# Patient Record
Sex: FEMALE | Race: WHITE | NOT HISPANIC OR LATINO | Employment: PART TIME | URBAN - METROPOLITAN AREA
[De-identification: names, ages, dates, MRNs, and addresses within clinical notes are randomized per-mention and may not be internally consistent; named-entity substitution may affect disease eponyms.]

---

## 2017-04-26 ENCOUNTER — ALLSCRIPTS OFFICE VISIT (OUTPATIENT)
Dept: OTHER | Facility: OTHER | Age: 27
End: 2017-04-26

## 2017-09-20 ENCOUNTER — OFFICE VISIT (OUTPATIENT)
Dept: URGENT CARE | Age: 27
End: 2017-09-20

## 2017-11-07 ENCOUNTER — ALLSCRIPTS OFFICE VISIT (OUTPATIENT)
Dept: OTHER | Facility: OTHER | Age: 27
End: 2017-11-07

## 2017-11-08 NOTE — PROGRESS NOTES
Assessment  1  Acute otitis media (382 9) (H66 90)    Plan  Acute otitis media    · Azithromycin 250 MG Oral Tablet; TAKE 2 TABLETS ON DAY one THEN TAKE one  TABLET A DAY FOR 4 DAYS    Discussion/Summary    Rest  drink plenty of fluids  return for new/worsening s/sx  The patient was counseled regarding instructions for management,-- risk factor reductions,-- prognosis,-- patient and family education,-- impressions  Chief Complaint  pt here c/o cough, stuffy nose, and right ear feeling clogged, pt c/o feeling like something stuck in throat, pt states she has had symptoms for about 1 week      History of Present Illness  HPI: Pt here for cold s/sx for approx 1 week   Cold Symptoms:   Beverly Call presents with complaints of gradual onset of constant episodes of mild cold symptoms Episodes started 1 week ago (Pt works as a  @ [employer])   Associated symptoms include sore throat,-- facial pressure,-- facial pain,-- headache-- and-- vomiting, but-- no wheezing,-- no shortness of breath,-- no nausea-- and-- no fever  The patient presents with complaints of nasal congestion (yellow)   The patient presents with complaints of productive cough (yellow mucous  also states she coughs severely and causes her to vomit  sensitive gag reflex)   The patient presents with complaints of ear pain (R ear)      Review of Systems    Constitutional: feeling poorly,-- chills-- and-- feeling tired, but-- no fever  ENT: as noted in HPI  Cardiovascular: no chest pain  Respiratory: cough, but-- no shortness of breath-- and-- no wheezing  Gastrointestinal: vomiting, but-- no abdominal pain-- and-- no nausea  Musculoskeletal: no arthralgias-- and-- no myalgias  Integumentary: no rashes  Active Problems  1  Depression with anxiety (300 4) (F41 8)   2  's permit PE (physical examination) (V70 3) (Z02 4)   3   Hypothyroidism (244 9) (E03 9)    Past Medical History  Active Problems And Past Medical History Reviewed: The active problems and past medical history were reviewed and updated today  Surgical History  Surgical History Reviewed: The surgical history was reviewed and updated today  Social History   · Denied: History of Alcohol use   · Denied: History of IV drugs   · Never smoker   · Denied: History of Uses marijuana  The social history was reviewed and updated today  The social history was reviewed and is unchanged  Family History  Family History Reviewed: The family history was reviewed and updated today  Current Meds   1  Daniel 1/20 TABS; Therapy: (Recorded:20Sep2017) to Recorded    The medication list was reviewed and updated today  Allergies  1  Latex Exam Gloves MISC   2  Penicillins  3  Nickel    Vitals   Recorded: [id number] 11:03AM   Temperature 98 5 F, Oral   Heart Rate 98   Systolic 560, LUE, Sitting   Diastolic 82, LUE, Sitting   Height 5 ft 2 in   Weight 113 lb    BMI Calculated 20 67   BSA Calculated 1 5   O2 Saturation 96, RA     Physical Exam    Constitutional   General appearance: No acute distress, well appearing and well nourished  Eyes   Conjunctiva and lids: No swelling, erythema or discharge  Pupils and irises: Equal, round and reactive to light  Ears, Nose, Mouth, and Throat   External inspection of ears and nose: Normal     Otoscopic examination: Abnormal  -- R ear TM + erythema and bulging  L TM normal    Nasal mucosa, septum, and turbinates: Normal without edema or erythema  Oropharynx: Normal with no erythema, edema, exudate or lesions  -- MMM  Pulmonary   Respiratory effort: No increased work of breathing or signs of respiratory distress  Auscultation of lungs: Clear to auscultation  -- no rhonchi or wheezing  Cardiovascular   Auscultation of heart: Normal rate and rhythm, normal S1 and S2, without murmurs  Lymphatic   Palpation of lymph nodes in neck: No lymphadenopathy      Musculoskeletal   Gait and station: Normal     Skin Skin and subcutaneous tissue: Normal without rashes or lesions  Psychiatric   Orientation to person, place, and time: Normal     Mood and affect: Normal          Future Appointments    Date/Time Provider Specialty Site   11/10/2017 01:15 PM Nidia Mauricio [facility]     Signatures   Electronically signed by :  [employer], [facility]; Nov 7 2017 11:25AM EST                       (Author)    Electronically signed by : Franchesca Valentin DO; Nov 7 2017  3:28PM EST

## 2017-11-10 ENCOUNTER — ALLSCRIPTS OFFICE VISIT (OUTPATIENT)
Dept: OTHER | Facility: OTHER | Age: 27
End: 2017-11-10

## 2018-01-10 NOTE — MISCELLANEOUS
Provider Comments  Provider Comments:   pt no call no show for appt   attempted to call pt unable to leave message      Signatures   Electronically signed by : Raquel Campbell [facility]; Nov 10 2017  3:19PM EST                       (Author)

## 2018-01-13 VITALS
SYSTOLIC BLOOD PRESSURE: 104 MMHG | BODY MASS INDEX: 20.8 KG/M2 | HEIGHT: 62 IN | DIASTOLIC BLOOD PRESSURE: 82 MMHG | WEIGHT: 113 LBS | TEMPERATURE: 98.5 F | OXYGEN SATURATION: 96 % | HEART RATE: 98 BPM

## 2018-01-16 NOTE — MISCELLANEOUS
Message  Return to work or school:  11/7   She is able to return to work on  11/8      Please excuse pt from work on 11/6  Signatures   Electronically signed by :  TRUE Byrd; Nov 7 2017 11:12AM EST                       (Author)

## 2018-01-17 NOTE — MISCELLANEOUS
Provider Comments  Provider Comments:   PT NO CALL NO SHOW FOR APPT 4/26/2017      Signatures   Electronically signed by : [employer], [address];  Apr 26 2017  4:18PM EST                       (Author)

## 2018-01-18 NOTE — PROGRESS NOTES
Assessment    1  's permit PE (physical examination) (V70 3) (Z02 4)    Plan  Unlinked    · Levothyroxine Sodium 75 MCG Oral Tablet    Discussion/Summary  Discussion Summary:   Form complete  Practice safe driving habits  Medication Side Effects Reviewed: Possible side effects of new medications were reviewed with the patient/guardian today  Understands and agrees with treatment plan: The treatment plan was reviewed with the patient/guardian  The patient/guardian understands and agrees with the treatment plan   Counseling Documentation With Imm: The patient was counseled regarding instructions for management, patient and family education, importance of compliance with treatment  Follow Up Instructions: Follow Up with your Primary Care Provider in days  If your symptoms worsen, [facility] the [facility]  Chief Complaint  Chief Complaint Free Text Note Form: self pay drivers physical      History of Present Illness  HPI: This is a 27year old female here today for learners permit physical  She has history of anxiety/ depression  she states it is controlled at this time  She had dizziness about 3 years ago which has now spontonenously resolved only last several months  she denies any other conditions  Hospital Based Practices Required Assessment:   Pain Assessment   the patient states they do not have pain  Abuse And Domestic Violence Screen   The patient states no one is hurting them  Depression And Suicide Screen  No, the patient has not had thoughts of hurting themself  No, the patient has not felt depressed in the past 7 days  Prefered Language is  english  Review of Systems  Focused-Female:   Constitutional: No fever, no chills, feels well, no tiredness, no recent weight gain or loss  Respiratory: no complaints of shortness of breath, no wheezing, no dyspnea on exertion, no orthopnea or PND     Gastrointestinal: no complaints of abdominal pain, no constipation, no nausea or diarrhea, no vomiting, no bloody stools  Genitourinary: no complaints of dysuria, no incontinence, no pelvic pain, no dysmenorrhea, no vaginal discharge or abnormal vaginal bleeding  Musculoskeletal: no complaints of arthralgia, no myalgia, no joint swelling or stiffness, no limb pain or swelling  Integumentary: no complaints of skin rash or lesion, no itching or dry skin, no skin wounds  Neurological: no complaints of headache, no confusion, no numbness or tingling, no dizziness or fainting  ROS Reviewed:   ROS reviewed  Active Problems    1  Depression with anxiety (300 4) (F41 8)   2  Hypothyroidism (244 9) (E03 9)    Past Medical History    1  History of Acute upper respiratory infection (465 9) (J06 9)   2  History of Anxiety and depression (300 4) (F41 8)   3  History of Atypical chest pain (786 59) (R07 89)   4  History of dizziness (V13 89) (Z87 898)   5  History of headache (V13 89) (Z87 898)   6  History of hypothyroidism (V12 29) (Z86 39)   7  History of urinary frequency (V13 09) (Z87 898)   8  History of UTI (lower urinary tract infection) (599 0) (N39 0)   9  History of Vaginal bleeding, abnormal (623 8) (N93 9)  Active Problems And Past Medical History Reviewed: The active problems and past medical history were reviewed and updated today  Family History  Father    1  Family history of ADHD (attention deficit hyperactivity disorder) evaluation   2  Family history of Dyslexia   3  Family history of asthma (V17 5) (Z82 5)  Grandfather    4  Family history of ADHD (attention deficit hyperactivity disorder) evaluation   5  Family history of Dyslexia   6  Family history of asthma (V17 5) (Z82 5)   7  Family history of diabetes mellitus (V18 0) (Z83 3)   8  Family history of hypertension (V17 49) (Z82 49)  Family History Reviewed: The family history was reviewed and updated today         Social History    · Denied: History of Alcohol use   · Denied: History of IV drugs   · Never smoker   · Denied: History of Uses marijuana  Social History Reviewed: The social history was reviewed and updated today  The social history was reviewed and is unchanged  Surgical History  Surgical History Reviewed: The surgical history was reviewed and updated today  Current Meds   1  Daniel 1/20 TABS; Therapy: (Recorded:08Bbc3174) to Recorded   2  Levothyroxine Sodium 75 MCG Oral Tablet; Therapy: 84QHA0238 to Recorded  Medication List Reviewed: The medication list was reviewed and updated today  Allergies    1  Latex Exam Gloves MISC   2  Penicillins    3  Nickel    Vitals  Signs   Recorded: 60ARE7571 03:10PM   Temperature: 98 2 F, Temporal  Heart Rate: 69  Respiration: 18  Systolic: 96  Diastolic: 70  Height: 5 ft 2 in  Weight: 98 lb   BMI Calculated: 17 92  BSA Calculated: 1 41  O2 Saturation: 100  LMP: [id number]  Pain Scale: 0    Physical Exam    Constitutional   General appearance: No acute distress, well appearing and well nourished  Eyes   Conjunctiva and lids: No swelling, erythema or discharge  Pupils and irises: Equal, round and reactive to light  Ears, Nose, Mouth, and Throat   External inspection of ears and nose: Normal     Otoscopic examination: Tympanic membranes translucent with normal light reflex  Canals patent without erythema  Nasal mucosa, septum, and turbinates: Normal without edema or erythema  Oropharynx: Normal with no erythema, edema, exudate or lesions  Pulmonary   Respiratory effort: No increased work of breathing or signs of respiratory distress  Auscultation of lungs: Clear to auscultation  Cardiovascular   Palpation of heart: Normal PMI, no thrills  Auscultation of heart: Normal rate and rhythm, normal S1 and S2, without murmurs  Abdomen   Abdomen: Non-tender, no masses  Liver and spleen: No hepatomegaly or splenomegaly      Psychiatric   Orientation to person, place, and time: Normal     Mood and affect: Normal        Signatures   Electronically signed by : Laura Blackwell; Sep 20 2017  3:44PM EST                       (Author)    Electronically signed by : Alberto Paul DO; Sep 21 2017 10:02AM EST                       (Co-author)

## 2018-01-23 ENCOUNTER — ALLSCRIPTS OFFICE VISIT (OUTPATIENT)
Dept: OTHER | Facility: OTHER | Age: 28
End: 2018-01-23

## 2018-01-23 DIAGNOSIS — E03.9 HYPOTHYROIDISM: ICD-10-CM

## 2018-01-24 NOTE — MISCELLANEOUS
Message  Return to work or school:   Adán Rodriguez is under my professional care   She was seen in my office on 01/23/2018   She is able to return to work on  01/24/2018            Signatures   Electronically signed by : Alicja Sánchez MD; Jan 23 2018  6:17PM EST

## 2018-01-24 NOTE — PROGRESS NOTES
Assessment    1  Hypothyroidism (244 9) (E03 9)   2  Acute pharyngitis, unspecified etiology (462) (J02 9)    Plan  Acute pharyngitis, unspecified etiology    · Azithromycin 250 MG Oral Tablet; Take 2 tablets today, then 1 tablet daily for 4 days   · ZyrTEC Allergy 10 MG Oral Tablet (Cetirizine HCl); TAKE 1 TABLET AT BEDTIME  Hypothyroidism    · (1) CBC/PLT/DIFF; Status:Active; Requested [id number];    · (1) COMPREHENSIVE METABOLIC PANEL; Status:Active; Requested [id number];    · (1) TSH; Status:Active; Requested [id number]; Discussion/Summary  The patient was counseled regarding diagnostic results, prognosis, impressions  Chief Complaint  PT here c/o flu like symptoms  PT stated has cough that is sometimes productive, sore throat, PT  stated has lots of pressure in her ears and also has body aches/chills  PT stated yesterday she was very light headed      History of Present Illness  Cold Symptoms:   Adán Rodriguez presents with complaints of gradual onset of constant episodes of mild cold symptoms Episodes started 1 week ago (Pt works as a  @ [employer])   Associated symptoms include facial pressure, facial pain, headache and vomiting, but no wheezing, no shortness of breath, no nausea and no fever  The patient presents with complaints of nasal congestion (yellow)   The patient presents with complaints of gradual onset of constant episodes of moderate bilateral sore throat, radiating to the bilateral neck  The patient presents with complaints of productive cough (yellow mucous  also states she coughs severely and causes her to vomit  sensitive gag reflex)   The patient presents with complaints of ear pain (R ear)      Review of Systems    Constitutional: feeling poorly, chills and feeling tired, but no fever  ENT: as noted in HPI  Cardiovascular: no chest pain  Respiratory: cough, but no shortness of breath and no wheezing     Gastrointestinal: vomiting, but no abdominal pain and no nausea  Musculoskeletal: no arthralgias and no myalgias  Integumentary: no rashes  Active Problems    1  Depression with anxiety (300 4) (F41 8)   2  's permit PE (physical examination) (V70 3) (Z02 4)   3  Hypothyroidism (244 9) (E03 9)    Social History    · Denied: History of Alcohol use   · Denied: History of IV drugs   · Never smoker   · Denied: History of Uses marijuana    Current Meds   1  Daniel 1/20 TABS; Therapy: (Recorded:20Sep2017) to Recorded    The medication list was reviewed and updated today  Allergies    1  Latex Exam Gloves MISC   2  Penicillins    3  Nickel    Vitals   Recorded: 25PUB2569 02:52PM   Temperature 98 6 F, Oral   Heart Rate 78   Systolic 849, LUE, Sitting   Diastolic 78, LUE, Sitting   Height 5 ft 3 in   Weight 105 lb    BMI Calculated 18 6   BSA Calculated 1 47   O2 Saturation 96, RA     Physical Exam    Constitutional   General appearance: No acute distress, well appearing and well nourished  Eyes   Conjunctiva and lids: No swelling, erythema or discharge  Pupils and irises: Equal, round and reactive to light  Ears, Nose, Mouth, and Throat   External inspection of ears and nose: Normal     Otoscopic examination: Abnormal   R ear TM + erythema and bulging  L TM normal    Nasal mucosa, septum, and turbinates: Normal without edema or erythema  Oropharynx: Normal with no erythema, edema, exudate or lesions  MMM  Pulmonary   Respiratory effort: No increased work of breathing or signs of respiratory distress  Auscultation of lungs: Clear to auscultation  no rhonchi or wheezing  Cardiovascular   Auscultation of heart: Normal rate and rhythm, normal S1 and S2, without murmurs  Lymphatic   Palpation of lymph nodes in neck: No lymphadenopathy  Musculoskeletal   Gait and station: Normal     Skin   Skin and subcutaneous tissue: Normal without rashes or lesions      Psychiatric   Orientation to person, place, and time: Normal     Mood and affect: Normal          Signatures   Electronically signed by : Sherri Lazaro MD; Jan 23 2018  3:16PM EST                       (Author)

## 2018-03-07 NOTE — PROGRESS NOTES
History of Present Illness    Revaccination   Vaccine Information: Vaccine(s) Given (names): Adacel  Spoke with patient regarding vaccine out of temperature range and risks and benefits of revaccination  Action(s): Pt will be revaccinated  Appointment scheduled: [id number]  Pt called (attempt 1): [phone] kz  Called [phone]  Pt called (attempt 2): [phone] kz  Called [phone] and [phone]  Called [phone]  Pt called (attempt 3): [phone] kz  Called [phone] and [phone]  Other Information: [phone] [id number] [facility] [phone] and unable to leave message  The subscriber is not available and no voicemail set up  Rosmery Renee  [phone] [facility] [phone] and unable to leave message  No voicemail set up and subscriber is not available  Called emergency contact number ([phone]) asking to relay message to Danielle Vargas  [phone] [facility] [phone] and unable to leave message  No voicemail set up and subscriber is not available  Called emergency contact number ([phone]) asking to relay message to Tiffanie  Rosmery Renee  [phone] [address] [place] [address] [phone] and spoke to her father Farhat Barrientos  He will relay the message to RE2 as she is out of town for a couple of days  Rosmery Renee  [phone] kz - Patient called stating that she has relocated to [place]  She would like to take advantage of the [facility] [place] offer when she comes to [place] to visit her family within the next few months  She will call the office to schedule the appointment when she knows the date of her trip  Rosmery Renee  [phone] [facility] and spoke with dad  Tiffanie is home in the area currently  He provided her phone #[phone]  Message left on that voicemail requesting a call back to schedule or decline RVAC  tom  [phone] kz - Patient called and scheduled RVAC for today  kz    Revaccination Completed: [id number]  Active Problems    1   Acute upper respiratory infection (465 9) (J06 9)   2  Atypical chest pain (786 59) (R07 89)   3  Depression with anxiety (300 4) (F41 8)   4  Dizziness (780 4) (R42)   5  Headache (784 0) (R51)   6  Hypothyroidism (244 9) (E03 9)   7  Need for immunization against diphtheria-tetanus-pertussis, combined [DTP] (V06 1)   (Z23)   8  Need for prophylactic vaccination and inoculation against influenza (V04 81) (Z23)   9  Need for revaccination (V05 9) (Z23)   10  Urine frequency (788 41) (R35 0)   11  UTI (lower urinary tract infection) (599 0) (N39 0)   12  Vaginal bleeding, abnormal (623 8) (N93 9)    Immunizations  Influenza --- Kiley Garcia: 16-Oct-2014   Tdap --- Kiley Garica: 16-Oct-2014     Current Meds   1  Levothyroxine Sodium 50 MCG Oral Tablet   2  Levothyroxine Sodium 75 MCG Oral Tablet   3  Nitrofurantoin Macrocrystal 100 MG Oral Capsule; TAKE 1 CAPSULE EVERY 12 HOURS   DAILY    Allergies    1  Latex Exam Gloves MISC   2  Penicillins    3   Nickel    Signatures   Electronically signed by : Cate Win MD; Apr 4 2017  1:56PM EST

## 2018-03-12 ENCOUNTER — TRANSCRIBE ORDERS (OUTPATIENT)
Dept: ADMINISTRATIVE | Age: 28
End: 2018-03-12

## 2018-03-12 ENCOUNTER — APPOINTMENT (OUTPATIENT)
Dept: LAB | Age: 28
End: 2018-03-12
Payer: COMMERCIAL

## 2018-03-12 DIAGNOSIS — E03.9 HYPOTHYROIDISM: ICD-10-CM

## 2018-03-12 LAB
ALBUMIN SERPL BCP-MCNC: 3.8 G/DL (ref 3.5–5)
ALP SERPL-CCNC: 64 U/L (ref 46–116)
ALT SERPL W P-5'-P-CCNC: 20 U/L (ref 12–78)
ANION GAP SERPL CALCULATED.3IONS-SCNC: 6 MMOL/L (ref 4–13)
AST SERPL W P-5'-P-CCNC: 16 U/L (ref 5–45)
BASOPHILS # BLD AUTO: 0.05 THOUSANDS/ΜL (ref 0–0.1)
BASOPHILS NFR BLD AUTO: 1 % (ref 0–1)
BILIRUB SERPL-MCNC: 0.29 MG/DL (ref 0.2–1)
BUN SERPL-MCNC: 7 MG/DL (ref 5–25)
CALCIUM SERPL-MCNC: 8.5 MG/DL (ref 8.3–10.1)
CHLORIDE SERPL-SCNC: 105 MMOL/L (ref 100–108)
CO2 SERPL-SCNC: 28 MMOL/L (ref 21–32)
CREAT SERPL-MCNC: 0.7 MG/DL (ref 0.6–1.3)
EOSINOPHIL # BLD AUTO: 0.08 THOUSAND/ΜL (ref 0–0.61)
EOSINOPHIL NFR BLD AUTO: 1 % (ref 0–6)
ERYTHROCYTE [DISTWIDTH] IN BLOOD BY AUTOMATED COUNT: 14.3 % (ref 11.6–15.1)
GFR SERPL CREATININE-BSD FRML MDRD: 121 ML/MIN/1.73SQ M
GLUCOSE SERPL-MCNC: 56 MG/DL (ref 65–140)
HCT VFR BLD AUTO: 37.8 % (ref 34.8–46.1)
HGB BLD-MCNC: 12.4 G/DL (ref 11.5–15.4)
LYMPHOCYTES # BLD AUTO: 2.15 THOUSANDS/ΜL (ref 0.6–4.47)
LYMPHOCYTES NFR BLD AUTO: 37 % (ref 14–44)
MCH RBC QN AUTO: 27.7 PG (ref 26.8–34.3)
MCHC RBC AUTO-ENTMCNC: 32.8 G/DL (ref 31.4–37.4)
MCV RBC AUTO: 84 FL (ref 82–98)
MONOCYTES # BLD AUTO: 0.48 THOUSAND/ΜL (ref 0.17–1.22)
MONOCYTES NFR BLD AUTO: 8 % (ref 4–12)
NEUTROPHILS # BLD AUTO: 3.12 THOUSANDS/ΜL (ref 1.85–7.62)
NEUTS SEG NFR BLD AUTO: 53 % (ref 43–75)
NRBC BLD AUTO-RTO: 0 /100 WBCS
PLATELET # BLD AUTO: 311 THOUSANDS/UL (ref 149–390)
PMV BLD AUTO: 10.4 FL (ref 8.9–12.7)
POTASSIUM SERPL-SCNC: 3.9 MMOL/L (ref 3.5–5.3)
PROT SERPL-MCNC: 7.6 G/DL (ref 6.4–8.2)
RBC # BLD AUTO: 4.48 MILLION/UL (ref 3.81–5.12)
SODIUM SERPL-SCNC: 139 MMOL/L (ref 136–145)
TSH SERPL DL<=0.05 MIU/L-ACNC: 11.3 UIU/ML (ref 0.36–3.74)
WBC # BLD AUTO: 5.89 THOUSAND/UL (ref 4.31–10.16)

## 2018-03-12 PROCEDURE — 80053 COMPREHEN METABOLIC PANEL: CPT

## 2018-03-12 PROCEDURE — 36415 COLL VENOUS BLD VENIPUNCTURE: CPT

## 2018-03-12 PROCEDURE — 84443 ASSAY THYROID STIM HORMONE: CPT

## 2018-03-12 PROCEDURE — 85025 COMPLETE CBC W/AUTO DIFF WBC: CPT

## 2018-03-13 ENCOUNTER — TELEPHONE (OUTPATIENT)
Dept: INTERNAL MEDICINE CLINIC | Age: 28
End: 2018-03-13

## 2018-03-13 NOTE — TELEPHONE ENCOUNTER
Pt called  Reviewed results  TSH elevated, hx of hypothyroid and has been on levothyroxine in past   Not currently on any medication  States she made an online appt with endo today - awaiting verification on time  She will follow-up with endo or if unable to get appt soon, she will contact our office and make appt to discuss thyroid and likely get back on levothyroxine  Of note has only been seen for acute problems in our office  Thyroid has not been addressed  No questions

## 2018-03-13 NOTE — TELEPHONE ENCOUNTER
Adelaida Dickson called and wanted to know the results of the BW she had done yesterday  If someone could please call her back and let her know  Thank you

## 2019-01-03 ENCOUNTER — OFFICE VISIT (OUTPATIENT)
Dept: INTERNAL MEDICINE CLINIC | Facility: CLINIC | Age: 29
End: 2019-01-03
Payer: COMMERCIAL

## 2019-01-03 VITALS
WEIGHT: 100.2 LBS | HEART RATE: 88 BPM | DIASTOLIC BLOOD PRESSURE: 64 MMHG | BODY MASS INDEX: 18.44 KG/M2 | HEIGHT: 62 IN | TEMPERATURE: 97.9 F | SYSTOLIC BLOOD PRESSURE: 98 MMHG | OXYGEN SATURATION: 97 %

## 2019-01-03 DIAGNOSIS — R53.83 FATIGUE, UNSPECIFIED TYPE: Primary | ICD-10-CM

## 2019-01-03 DIAGNOSIS — E03.9 HYPOTHYROIDISM, UNSPECIFIED TYPE: ICD-10-CM

## 2019-01-03 LAB
ALBUMIN SERPL BCP-MCNC: 3.9 G/DL (ref 3.5–5)
ALP SERPL-CCNC: 62 U/L (ref 46–116)
ALT SERPL W P-5'-P-CCNC: 17 U/L (ref 12–78)
ANION GAP SERPL CALCULATED.3IONS-SCNC: 8 MMOL/L (ref 4–13)
AST SERPL W P-5'-P-CCNC: 12 U/L (ref 5–45)
BASOPHILS # BLD AUTO: 0.06 THOUSANDS/ΜL (ref 0–0.1)
BASOPHILS NFR BLD AUTO: 1 % (ref 0–1)
BILIRUB SERPL-MCNC: 0.29 MG/DL (ref 0.2–1)
BUN SERPL-MCNC: 6 MG/DL (ref 5–25)
CALCIUM SERPL-MCNC: 8.8 MG/DL (ref 8.3–10.1)
CHLORIDE SERPL-SCNC: 104 MMOL/L (ref 100–108)
CO2 SERPL-SCNC: 26 MMOL/L (ref 21–32)
CREAT SERPL-MCNC: 0.63 MG/DL (ref 0.6–1.3)
EOSINOPHIL # BLD AUTO: 0.15 THOUSAND/ΜL (ref 0–0.61)
EOSINOPHIL NFR BLD AUTO: 3 % (ref 0–6)
ERYTHROCYTE [DISTWIDTH] IN BLOOD BY AUTOMATED COUNT: 12.6 % (ref 11.6–15.1)
GFR SERPL CREATININE-BSD FRML MDRD: 124 ML/MIN/1.73SQ M
GLUCOSE P FAST SERPL-MCNC: 77 MG/DL (ref 65–99)
HCT VFR BLD AUTO: 38.1 % (ref 34.8–46.1)
HGB BLD-MCNC: 12.2 G/DL (ref 11.5–15.4)
IMM GRANULOCYTES # BLD AUTO: 0.01 THOUSAND/UL (ref 0–0.2)
IMM GRANULOCYTES NFR BLD AUTO: 0 % (ref 0–2)
LYMPHOCYTES # BLD AUTO: 2.57 THOUSANDS/ΜL (ref 0.6–4.47)
LYMPHOCYTES NFR BLD AUTO: 43 % (ref 14–44)
MCH RBC QN AUTO: 29 PG (ref 26.8–34.3)
MCHC RBC AUTO-ENTMCNC: 32 G/DL (ref 31.4–37.4)
MCV RBC AUTO: 91 FL (ref 82–98)
MONOCYTES # BLD AUTO: 0.51 THOUSAND/ΜL (ref 0.17–1.22)
MONOCYTES NFR BLD AUTO: 9 % (ref 4–12)
NEUTROPHILS # BLD AUTO: 2.65 THOUSANDS/ΜL (ref 1.85–7.62)
NEUTS SEG NFR BLD AUTO: 44 % (ref 43–75)
NRBC BLD AUTO-RTO: 0 /100 WBCS
PLATELET # BLD AUTO: 287 THOUSANDS/UL (ref 149–390)
PMV BLD AUTO: 11 FL (ref 8.9–12.7)
POTASSIUM SERPL-SCNC: 4 MMOL/L (ref 3.5–5.3)
PROT SERPL-MCNC: 7.2 G/DL (ref 6.4–8.2)
RBC # BLD AUTO: 4.2 MILLION/UL (ref 3.81–5.12)
SODIUM SERPL-SCNC: 138 MMOL/L (ref 136–145)
T3FREE SERPL-MCNC: 2.44 PG/ML (ref 2.3–4.2)
T4 FREE SERPL-MCNC: 0.72 NG/DL (ref 0.76–1.46)
TSH SERPL DL<=0.05 MIU/L-ACNC: 9.56 UIU/ML
WBC # BLD AUTO: 5.95 THOUSAND/UL (ref 4.31–10.16)

## 2019-01-03 PROCEDURE — 85025 COMPLETE CBC W/AUTO DIFF WBC: CPT | Performed by: NURSE PRACTITIONER

## 2019-01-03 PROCEDURE — 84439 ASSAY OF FREE THYROXINE: CPT | Performed by: NURSE PRACTITIONER

## 2019-01-03 PROCEDURE — 82306 VITAMIN D 25 HYDROXY: CPT | Performed by: NURSE PRACTITIONER

## 2019-01-03 PROCEDURE — 99214 OFFICE O/P EST MOD 30 MIN: CPT | Performed by: NURSE PRACTITIONER

## 2019-01-03 PROCEDURE — 84481 FREE ASSAY (FT-3): CPT | Performed by: NURSE PRACTITIONER

## 2019-01-03 PROCEDURE — 84443 ASSAY THYROID STIM HORMONE: CPT | Performed by: NURSE PRACTITIONER

## 2019-01-03 PROCEDURE — 36415 COLL VENOUS BLD VENIPUNCTURE: CPT | Performed by: NURSE PRACTITIONER

## 2019-01-03 PROCEDURE — 80053 COMPREHEN METABOLIC PANEL: CPT | Performed by: NURSE PRACTITIONER

## 2019-01-03 RX ORDER — LEVOTHYROXINE SODIUM 75 MCG
75 TABLET ORAL DAILY
Refills: 3 | COMMUNITY
Start: 2018-10-30 | End: 2019-03-01 | Stop reason: CLARIF

## 2019-01-03 NOTE — PROGRESS NOTES
Assessment/Plan:    No problem-specific Assessment & Plan notes found for this encounter  {Assess/PlanSmartLinks:43488}      Subjective:      Patient ID: Juno Tinoco is a 34 y o  female  Headache (Pt is here today complaining of headaches, being weak, very tired and light headed  This has been going on for about a month now  She thought it could be her pills and stopped them but she still felt the same way  Taking Aleve for the headaches  She is also falling asleep while she is driving  )      Headache          {Common ambulatory SmartLinks:62946}    Review of Systems   Neurological: Positive for headaches  Past Medical History:   Diagnosis Date    Anxiety     Last Asessed: 12 Sep 2014    Depression     Last Assesd: 12 Sep 2014    Hypothyroidism          Current Outpatient Prescriptions:     SYNTHROID 75 MCG tablet, Take 75 mcg by mouth daily, Disp: , Rfl: 3    Allergies   Allergen Reactions    Other     Penicillins     Latex      Other reaction(s): rash    Nickel Rash       Social History   History reviewed  No pertinent surgical history  Family History   Problem Relation Age of Onset   Melissa Nine ADD / ADHD Father     Asthma Father     Other Father         dyslexia    ADD / ADHD Other     Asthma Other     Other Other         dyslexia    Diabetes Other     Hypertension Other        Objective:  BP 98/64 (BP Location: Left arm, Patient Position: Sitting, Cuff Size: Adult)   Pulse 88   Temp 97 9 °F (36 6 °C) (Oral)   Ht 5' 2" (1 575 m)   Wt 45 5 kg (100 lb 3 2 oz)   SpO2 97% Comment: room air  BMI 18 33 kg/m²     No results found for this or any previous visit (from the past 1344 hour(s))           Physical Exam   Constitutional:   Thin and pale

## 2019-01-03 NOTE — ASSESSMENT & PLAN NOTE
Patient has been noncompliant with her Synthroid, she is not taking her Synthroid in the past 3 weeks  Patient had a TSH of 11 3 on 03/16/2018, patient had seen [facility] endocrinology, and was started on Synthroid 75 mcg daily and was transitioned back to our practice, patient was to have follow-up blood work in 6 weeks however patient never had gotten this blood work done  Will get TSH while in office today, and advised patient to restart her Synthroid at 75 mcg daily  I did discuss with patient the importance of keeping her regular scheduled follow-ups to keep her hyperthyroidism under better control  Will have patient follow up with me in approximately 7 weeks with a follow-up TSH prior to her appointment

## 2019-01-03 NOTE — PROGRESS NOTES
Assessment/Plan:    Hypothyroidism  Patient has been noncompliant with her Synthroid, she is not taking her Synthroid in the past 3 weeks  Patient had a TSH of 11 3 on 03/16/2018, patient had seen [facility], and was started on Synthroid 75 mcg daily and was transitioned back to our practice, patient was to have follow-up blood work in 6 weeks however patient never had gotten this blood work done  Will get TSH while in office today, and advised patient to restart her Synthroid at 75 mcg daily  I did discuss with patient the importance of keeping her regular scheduled follow-ups to keep her hyperthyroidism under better control  Will have patient follow up with me in approximately 7 weeks with a follow-up TSH prior to her appointment  Fatigue  The patient's symptoms of fatigue seemed to be multifactorial, patient currently works night shift the, will get blood work which will consist of CBC, CMP as well as vitamin-D level and TSH free T3 and free T4  Diagnoses and all orders for this visit:    Fatigue, unspecified type  -     CBC and differential  -     Comprehensive metabolic panel; Future  -     Vitamin D 25 hydroxy  -     Comprehensive metabolic panel    Hypothyroidism, unspecified type  -     TSH baseline; Future  -     T4, free; Future  -     T3, free; Future  -     TSH baseline  -     T4, free  -     T3, free    Other orders  -     SYNTHROID 75 MCG tablet; Take 75 mcg by mouth daily          Subjective:      Patient ID: Carron Curling is a 34 y o  female  Patient presents today with complaints of headaches, being weak, very tired and lightheaded  Patient states that this has been going on for approximately 1 month now    Patient does have known history of hypothyroidism, was seen Endocrinology however did not get follow-up blood work since that time, patient states she stopped taking her levothyroxine approximately 3 weeks ago as she felt that her symptoms are coming from this medication  Patient states that she stopped eating meat approximately 3 weeks ago, of note patient also works night shift  Patient states she sleeps about 10 hr a day and wakes up not feeling rested  Patient denies snoring at night or waking up gasping for air  Patient denies spending time in wooded areas, and patient denies ever having mono  Patient's headaches are approximately 1 to 2 times a week, which is usually relieved with Aleve  The following portions of the patient's history were reviewed and updated as appropriate: allergies, current medications, past family history, past medical history, past social history, past surgical history and problem list     Review of Systems   Constitutional: Positive for fatigue  Negative for activity change, appetite change, chills, diaphoresis and fever  HENT: Negative for congestion, ear discharge, ear pain, postnasal drip, rhinorrhea, sinus pain, sinus pressure and sore throat  Eyes: Negative for pain, discharge, itching and visual disturbance  Respiratory: Negative for cough, chest tightness, shortness of breath and wheezing  Cardiovascular: Negative for chest pain, palpitations and leg swelling  Gastrointestinal: Negative for abdominal pain, blood in stool, constipation, diarrhea, nausea and vomiting  Endocrine: Negative for polydipsia, polyphagia and polyuria  Genitourinary: Negative for difficulty urinating, dysuria, hematuria and urgency  Musculoskeletal: Negative for arthralgias, back pain and neck pain  Skin: Negative for rash and wound  Neurological: Positive for light-headedness and headaches  Negative for dizziness, weakness and numbness           Past Medical History:   Diagnosis Date    Anxiety     Last Asessed: 12 Sep 2014    Depression     Last Assesd: 12 Sep 2014    Hypothyroidism          Current Outpatient Prescriptions:     SYNTHROID 75 MCG tablet, Take 75 mcg by mouth daily, Disp: , Rfl: 3    Allergies Allergen Reactions    Other     Penicillins     Latex      Other reaction(s): rash    Nickel Rash       Social History   History reviewed  No pertinent surgical history  Family History   Problem Relation Age of Onset   [facility] ADD / ADHD Father     Asthma Father     Other Father         dyslexia    ADD / ADHD Other     Asthma Other     Other Other         dyslexia    Diabetes Other     Hypertension Other        Objective:  BP 98/64 (BP Location: Left arm, Patient Position: Sitting, Cuff Size: Adult)   Pulse 88   Temp 97 9 °F (36 6 °C) (Oral)   Ht 5' 2" (1 575 m)   Wt 45 5 kg (100 lb 3 2 oz)   SpO2 97% Comment: room air  BMI 18 33 kg/m²     No results found for this or any previous visit (from the past 1344 hour(s))  Physical Exam   Constitutional: She is oriented to person, place, and time  She appears well-developed and well-nourished  No distress  Patient very thin and pale   HENT:   Head: Normocephalic and atraumatic  Right Ear: External ear normal    Left Ear: External ear normal    Nose: Nose normal    Mouth/Throat: Oropharynx is clear and moist  No oropharyngeal exudate  Eyes: Pupils are equal, round, and reactive to light  Conjunctivae and EOM are normal  Right eye exhibits no discharge  Left eye exhibits no discharge  Neck: Normal range of motion  Neck supple  No thyromegaly present  Cardiovascular: Normal rate, regular rhythm, normal heart sounds and intact distal pulses  Exam reveals no gallop and no friction rub  No murmur heard  Pulmonary/Chest: Effort normal and breath sounds normal  No stridor  No respiratory distress  She has no wheezes  She has no rales  Abdominal: Soft  Bowel sounds are normal  She exhibits no distension  There is no tenderness  Lymphadenopathy:     She has no cervical adenopathy  Neurological: She is alert and oriented to person, place, and time  Skin: Skin is warm and dry  No rash noted  She is not diaphoretic  No erythema  Psychiatric: She has a normal mood and affect   Her behavior is normal  Judgment and thought content normal

## 2019-01-03 NOTE — ASSESSMENT & PLAN NOTE
The patient's symptoms of fatigue seemed to be multifactorial, patient currently works night shift the, will get blood work which will consist of CBC, CMP as well as vitamin-D level and TSH free T3 and free T4

## 2019-01-04 DIAGNOSIS — E03.9 HYPOTHYROIDISM, UNSPECIFIED TYPE: Primary | ICD-10-CM

## 2019-01-04 LAB — 25(OH)D3 SERPL-MCNC: 18.4 NG/ML (ref 30–100)

## 2019-01-08 DIAGNOSIS — E55.9 VITAMIN D DEFICIENCY: Primary | ICD-10-CM

## 2019-01-08 RX ORDER — ERGOCALCIFEROL (VITAMIN D2) 1250 MCG
50000 CAPSULE ORAL WEEKLY
Qty: 12 CAPSULE | Refills: 0 | Status: SHIPPED | OUTPATIENT
Start: 2019-01-08 | End: 2019-05-29 | Stop reason: ALTCHOICE

## 2019-02-11 ENCOUNTER — OFFICE VISIT (OUTPATIENT)
Dept: INTERNAL MEDICINE CLINIC | Facility: CLINIC | Age: 29
End: 2019-02-11
Payer: COMMERCIAL

## 2019-02-11 VITALS
HEART RATE: 90 BPM | SYSTOLIC BLOOD PRESSURE: 96 MMHG | WEIGHT: 100 LBS | HEIGHT: 61 IN | TEMPERATURE: 97.8 F | DIASTOLIC BLOOD PRESSURE: 72 MMHG | BODY MASS INDEX: 18.88 KG/M2 | OXYGEN SATURATION: 99 %

## 2019-02-11 DIAGNOSIS — N89.8 VAGINAL ITCHING: ICD-10-CM

## 2019-02-11 DIAGNOSIS — H92.02 REFERRED EAR PAIN, LEFT: ICD-10-CM

## 2019-02-11 DIAGNOSIS — F41.8 DEPRESSION WITH ANXIETY: ICD-10-CM

## 2019-02-11 DIAGNOSIS — H69.82 EUSTACHIAN TUBE DYSFUNCTION, LEFT: ICD-10-CM

## 2019-02-11 DIAGNOSIS — E03.9 HYPOTHYROIDISM, UNSPECIFIED TYPE: Primary | ICD-10-CM

## 2019-02-11 PROCEDURE — 99214 OFFICE O/P EST MOD 30 MIN: CPT | Performed by: NURSE PRACTITIONER

## 2019-02-11 PROCEDURE — 3008F BODY MASS INDEX DOCD: CPT | Performed by: NURSE PRACTITIONER

## 2019-02-11 RX ORDER — NORETHINDRONE ACETATE AND ETHINYL ESTRADIOL 1.5-30(21)
1 KIT ORAL DAILY
COMMUNITY
Start: 2018-07-26 | End: 2019-07-26

## 2019-02-11 NOTE — PATIENT INSTRUCTIONS
Ear pain: Advised to start taking Flonase nasal spray to help open nasal passages and sinuses  This can be used up to two sprays in each nostril daily, whether it is one spray in each nostril in the morning and evening, or twice once per day  Once acute symptoms have improved, may continue to use at dosing of one spray in each nostril daily if symptoms appear to have an allergic component  This medication does work best when taken consistently for allergic-type symptoms  Also, pain is likely referred from teeth, so follow up with your dentist regarding your pending xrays  Thyroid: Get labs done tomorrow    Depression: Get thyroid labs done and follow up at next appointment  Follow up sooner if needed       Yeast infection: Start using monistat OTC and if symptoms don't improve, return for an exam

## 2019-02-11 NOTE — PROGRESS NOTES
Assessment/Plan:    No problem-specific Assessment & Plan notes found for this encounter  Diagnoses and all orders for this visit:    Hypothyroidism, unspecified type  Get thyroid labs checked in the next few days, as this may be impacting depressive symptoms, as well  Last TSH in January was over 9, but patient reports that she has now been consistent with taking her synthroid  Depression with anxiety  Will check thyroid function  Discussed counselor and coping mechanisms, Reviewed red flag signs to call the office with or go to the Emergency Department with  Patient verbalizes understanding  Patient will likely benefit from medication management once labs are rechecked  Referred ear pain, left  Pain is likely related to dental pain  Follow up with Dentist as discussed  Eustachian tube dysfunction, left  Start flonase  Monitor effect    Other orders  -     norethindrone-ethinyl estradiol-iron (MICROGESTIN FE1 5/30) 1 5-30 MG-MCG tablet; Take 1 tablet by mouth      Vaginal itching: Patient has chosen to start OTC monistat and return for pelvic exam and cultures if her symptoms fail to improve or worsen    Follow up with Jefferson Gonzalez next week at scheduled follow up or sooner as needed  Greater than 35 minutes was spent with this patient, with more than half of that time spent counseling  Subjective:       Patient ID: Elle Napier is a 34 y o  female  Patient presents today for an acute visit  She reports  right ear pain, wisdom teeth and right lateral neck pain since yesterday  The pain in her ear is intermittent and is of a sharp quality with intensity of 4/10  She states that the pain has decreased since yesterday when the pain was 6/10  She states that the pain radiates towards the right wisdom teeth and down her neck  There is no discharge coming out from the ear, nor does she experience any popping in her year  She states that she has cold symptoms/ congestion for the past month   The congestion is mostly in her sinuses and she does experience SOB, and having trouble inhaling and exhaling completely  She notes that she has always has been short of breath and could be due to her anxiety  Her shortness of breath during anxiety is accompanied by palpitations but for the past couple of days her shortness of breath is without palpitations  She has been taking Nyquil twice in the past month  but it did not relieve her symptoms  She has also been experiencing chest tightness  Denies any nausea, vomiting, fevers, and chills  Patient has had anxiety for the past 8 years which has been worsening over the past 2 years  She states that she is anxious through out the day and does get panic attacks which are accompanied with palpitations, numbness, and SOB  She does have passive SI but does not currently have a plan  She also reports her niece as a protective factor  She has a hx of cutting  She was seen yesterday in the ER for her symptoms, that note was reviewed  Her her labs, chest xray, and EKG were all WNL  Her thyroid function was not checked during her visit  She reports that she has seen a counselor in the past and would be interested in following up with one  She also reports vaginal itching that has worsened over the past week  She reports that she has not tried any OTC remedies, such as monistat, as she was not aware that they were available  She believes that she has a yeast infection  The following portions of the patient's history were reviewed and updated as appropriate: allergies, current medications, past family history, past medical history, past social history, past surgical history and problem list     Review of Systems   Constitutional: Positive for chills and fatigue (always feels tired becase of thyroid)  Negative for appetite change, diaphoresis and fever     HENT: Positive for congestion, dental problem (wisdom tooth pain), ear pain, facial swelling, hearing loss (Constant muffled hearing ), postnasal drip and sore throat  Negative for ear discharge, mouth sores, rhinorrhea, sinus pressure, sinus pain, sneezing and trouble swallowing  Eyes: Negative for redness and itching  Respiratory: Positive for chest tightness and shortness of breath  Negative for wheezing  Cardiovascular: Positive for palpitations  Gastrointestinal: Negative for abdominal pain, constipation, diarrhea and nausea (only when she feels anxious)  Genitourinary: Negative for difficulty urinating and dysuria  Neurological: Positive for numbness (During anxiety attacks) and headaches  Negative for dizziness  Objective:      BP 96/72 (BP Location: Left arm, Patient Position: Sitting, Cuff Size: Standard)   Pulse 90   Temp 97 8 °F (36 6 °C) (Oral)   Ht 5' 1" (1 549 m)   Wt 45 4 kg (100 lb)   SpO2 99%   BMI 18 89 kg/m²          Physical Exam   Constitutional: She is oriented to person, place, and time  She appears well-developed and well-nourished  No distress  HENT:   Head: Normocephalic and atraumatic  Right Ear: External ear normal  Tympanic membrane is bulging  Tympanic membrane is not erythematous  Left Ear: External ear normal  Tympanic membrane is not erythematous and not bulging  Mouth/Throat: Oropharynx is clear and moist    Eyes: Pupils are equal, round, and reactive to light  Conjunctivae are normal  No scleral icterus  Neck: Normal range of motion  Neck supple  No thyromegaly present  Cardiovascular: Normal rate, regular rhythm and normal heart sounds  Pulmonary/Chest: Effort normal and breath sounds normal  No respiratory distress  Abdominal: Soft  Bowel sounds are normal  She exhibits no distension  Musculoskeletal: Normal range of motion  She exhibits no edema  Neurological: She is alert and oriented to person, place, and time  Skin: Skin is warm and dry  Psychiatric: She has a normal mood and affect   Her behavior is normal  Judgment and thought content normal

## 2019-02-27 ENCOUNTER — OFFICE VISIT (OUTPATIENT)
Dept: INTERNAL MEDICINE CLINIC | Facility: CLINIC | Age: 29
End: 2019-02-27
Payer: COMMERCIAL

## 2019-02-27 VITALS
BODY MASS INDEX: 19.07 KG/M2 | WEIGHT: 101 LBS | OXYGEN SATURATION: 99 % | SYSTOLIC BLOOD PRESSURE: 92 MMHG | HEIGHT: 61 IN | TEMPERATURE: 98.6 F | HEART RATE: 82 BPM | DIASTOLIC BLOOD PRESSURE: 64 MMHG

## 2019-02-27 DIAGNOSIS — E03.9 HYPOTHYROIDISM, UNSPECIFIED TYPE: ICD-10-CM

## 2019-02-27 DIAGNOSIS — F41.8 DEPRESSION WITH ANXIETY: Primary | ICD-10-CM

## 2019-02-27 DIAGNOSIS — E55.9 VITAMIN D DEFICIENCY: ICD-10-CM

## 2019-02-27 DIAGNOSIS — R53.83 FATIGUE, UNSPECIFIED TYPE: ICD-10-CM

## 2019-02-27 LAB
T4 FREE SERPL-MCNC: 0.9 NG/DL (ref 0.76–1.46)
TSH SERPL DL<=0.05 MIU/L-ACNC: 16.1 UIU/ML (ref 0.36–3.74)

## 2019-02-27 PROCEDURE — 1036F TOBACCO NON-USER: CPT | Performed by: NURSE PRACTITIONER

## 2019-02-27 PROCEDURE — 99214 OFFICE O/P EST MOD 30 MIN: CPT | Performed by: NURSE PRACTITIONER

## 2019-02-27 PROCEDURE — 84439 ASSAY OF FREE THYROXINE: CPT | Performed by: NURSE PRACTITIONER

## 2019-02-27 PROCEDURE — 36415 COLL VENOUS BLD VENIPUNCTURE: CPT | Performed by: NURSE PRACTITIONER

## 2019-02-27 PROCEDURE — 84443 ASSAY THYROID STIM HORMONE: CPT | Performed by: NURSE PRACTITIONER

## 2019-02-27 RX ORDER — ESCITALOPRAM OXALATE 5 MG/1
5 TABLET ORAL DAILY
Qty: 30 TABLET | Refills: 0 | Status: SHIPPED | OUTPATIENT
Start: 2019-02-27 | End: 2019-04-30 | Stop reason: SINTOL

## 2019-02-27 NOTE — ASSESSMENT & PLAN NOTE
Patient will get updated TSH today, in the meantime patient is to continue with levothyroxine 75 mcg daily

## 2019-02-27 NOTE — ASSESSMENT & PLAN NOTE
Will start patient on Lexapro 5 mg tablet daily, patient is to not abruptly stop this medication, advised patient that this may take approximately 4-8 weeks to start working  Will follow up with patient in 1 month  Will also give patient referral to Psychology  Discussed red flag warning signs with patient when she should report back to the emergency room

## 2019-02-27 NOTE — ASSESSMENT & PLAN NOTE
Patient is to continue with ergocalciferol 76700 units weekly for 12 weeks, then transition to [employer] Units daily
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
## 2019-02-27 NOTE — ASSESSMENT & PLAN NOTE
Patient's symptoms of fatigue remain multifactorial, patient currently works night shift, patient's TSH was previously not well controlled, and patient had low vitamin-D level as well as increase in depression  Patient overall feels better, will repeat a TSH today

## 2019-03-01 ENCOUNTER — TELEPHONE (OUTPATIENT)
Dept: INTERNAL MEDICINE CLINIC | Age: 29
End: 2019-03-01

## 2019-03-01 DIAGNOSIS — E03.9 HYPOTHYROIDISM, UNSPECIFIED TYPE: Primary | ICD-10-CM

## 2019-03-01 RX ORDER — LEVOTHYROXINE SODIUM 0.1 MG/1
100 TABLET ORAL DAILY
Qty: 30 TABLET | Refills: 0 | Status: SHIPPED | OUTPATIENT
Start: 2019-03-01 | End: 2019-04-30 | Stop reason: SDDI

## 2019-03-01 NOTE — TELEPHONE ENCOUNTER
Patient return call back to review TSH, patient's TSH  from 9-16, patient states she has been taking her levothyroxine  As prescribed, she states she takes her medication about 30 minutes to an hour before meals, she does not take it with any other medications  Will increase patient's TSH from , and recheck TSH in 6 weeks  Advised patient if she had any further questions to reach out to me

## 2019-04-30 ENCOUNTER — OFFICE VISIT (OUTPATIENT)
Dept: INTERNAL MEDICINE CLINIC | Facility: CLINIC | Age: 29
End: 2019-04-30
Payer: COMMERCIAL

## 2019-04-30 VITALS
BODY MASS INDEX: 19.43 KG/M2 | WEIGHT: 105.6 LBS | DIASTOLIC BLOOD PRESSURE: 70 MMHG | HEIGHT: 62 IN | HEART RATE: 86 BPM | OXYGEN SATURATION: 98 % | TEMPERATURE: 98.9 F | SYSTOLIC BLOOD PRESSURE: 100 MMHG

## 2019-04-30 DIAGNOSIS — F43.21 GRIEF REACTION: ICD-10-CM

## 2019-04-30 DIAGNOSIS — E03.9 ACQUIRED HYPOTHYROIDISM: Primary | ICD-10-CM

## 2019-04-30 DIAGNOSIS — F41.8 DEPRESSION WITH ANXIETY: ICD-10-CM

## 2019-04-30 PROCEDURE — 99214 OFFICE O/P EST MOD 30 MIN: CPT | Performed by: INTERNAL MEDICINE

## 2019-04-30 RX ORDER — LEVOTHYROXINE SODIUM 0.1 MG/1
100 TABLET ORAL DAILY
Qty: 30 TABLET | Refills: 1 | Status: SHIPPED | OUTPATIENT
Start: 2019-04-30 | End: 2019-05-29 | Stop reason: SDUPTHER

## 2019-04-30 RX ORDER — ALPRAZOLAM 0.25 MG/1
0.25 TABLET ORAL
Qty: 10 TABLET | Refills: 0 | Status: SHIPPED | OUTPATIENT
Start: 2019-04-30 | End: 2019-05-29 | Stop reason: ALTCHOICE

## 2019-05-06 ENCOUNTER — CLINICAL SUPPORT (OUTPATIENT)
Dept: INTERNAL MEDICINE CLINIC | Facility: CLINIC | Age: 29
End: 2019-05-06
Payer: COMMERCIAL

## 2019-05-06 DIAGNOSIS — E03.9 ACQUIRED HYPOTHYROIDISM: Primary | ICD-10-CM

## 2019-05-06 LAB
T4 FREE SERPL-MCNC: 1.23 NG/DL (ref 0.76–1.46)
TSH SERPL DL<=0.05 MIU/L-ACNC: 7.6 UIU/ML (ref 0.36–3.74)

## 2019-05-06 PROCEDURE — 84443 ASSAY THYROID STIM HORMONE: CPT | Performed by: INTERNAL MEDICINE

## 2019-05-06 PROCEDURE — 84439 ASSAY OF FREE THYROXINE: CPT

## 2019-05-06 PROCEDURE — 36415 COLL VENOUS BLD VENIPUNCTURE: CPT

## 2019-05-29 ENCOUNTER — OFFICE VISIT (OUTPATIENT)
Dept: INTERNAL MEDICINE CLINIC | Facility: CLINIC | Age: 29
End: 2019-05-29
Payer: COMMERCIAL

## 2019-05-29 VITALS
WEIGHT: 106 LBS | OXYGEN SATURATION: 99 % | BODY MASS INDEX: 19.51 KG/M2 | SYSTOLIC BLOOD PRESSURE: 104 MMHG | HEART RATE: 99 BPM | HEIGHT: 62 IN | TEMPERATURE: 98.5 F | DIASTOLIC BLOOD PRESSURE: 76 MMHG

## 2019-05-29 DIAGNOSIS — R53.83 FATIGUE, UNSPECIFIED TYPE: ICD-10-CM

## 2019-05-29 DIAGNOSIS — E03.9 ACQUIRED HYPOTHYROIDISM: Primary | ICD-10-CM

## 2019-05-29 DIAGNOSIS — F41.8 DEPRESSION WITH ANXIETY: ICD-10-CM

## 2019-05-29 DIAGNOSIS — E55.9 VITAMIN D DEFICIENCY: ICD-10-CM

## 2019-05-29 PROCEDURE — 99214 OFFICE O/P EST MOD 30 MIN: CPT | Performed by: NURSE PRACTITIONER

## 2019-05-29 PROCEDURE — 3008F BODY MASS INDEX DOCD: CPT | Performed by: NURSE PRACTITIONER

## 2019-05-29 RX ORDER — ERGOCALCIFEROL (VITAMIN D2) 1250 MCG
50000 CAPSULE ORAL WEEKLY
Qty: 12 CAPSULE | Refills: 0 | Status: SHIPPED | OUTPATIENT
Start: 2019-05-29

## 2019-05-29 RX ORDER — LEVOTHYROXINE SODIUM 0.1 MG/1
100 TABLET ORAL DAILY
Qty: 30 TABLET | Refills: 1 | Status: SHIPPED | OUTPATIENT
Start: 2019-05-29 | End: 2019-07-29 | Stop reason: SDUPTHER

## 2019-06-08 ENCOUNTER — OFFICE VISIT (OUTPATIENT)
Dept: URGENT CARE | Age: 29
End: 2019-06-08
Payer: COMMERCIAL

## 2019-06-08 VITALS
WEIGHT: 106 LBS | HEIGHT: 62 IN | OXYGEN SATURATION: 100 % | HEART RATE: 81 BPM | DIASTOLIC BLOOD PRESSURE: 68 MMHG | BODY MASS INDEX: 19.51 KG/M2 | TEMPERATURE: 98 F | SYSTOLIC BLOOD PRESSURE: 110 MMHG | RESPIRATION RATE: 18 BRPM

## 2019-06-08 DIAGNOSIS — F41.0 PANIC ATTACKS: Primary | ICD-10-CM

## 2019-06-08 PROCEDURE — G0382 LEV 3 HOSP TYPE B ED VISIT: HCPCS | Performed by: NURSE PRACTITIONER

## 2019-06-08 PROCEDURE — S9083 URGENT CARE CENTER GLOBAL: HCPCS | Performed by: NURSE PRACTITIONER

## 2019-07-29 DIAGNOSIS — E03.9 ACQUIRED HYPOTHYROIDISM: ICD-10-CM

## 2019-07-29 RX ORDER — LEVOTHYROXINE SODIUM 0.1 MG/1
100 TABLET ORAL DAILY
Qty: 90 TABLET | Refills: 1 | Status: SHIPPED | OUTPATIENT
Start: 2019-07-29

## 2019-07-29 NOTE — TELEPHONE ENCOUNTER
MED: Levothyroxine 100mg tablet   SUPPLY: 90Day  PHARMACY: [facility]  PATIENT PHONE #:[phone]  LAST OV: 5/29/2019  UPCOMING OV: LMOM for patient to call back and schedule juma for any further medication refills

## 2020-04-20 ENCOUNTER — TELEPHONE (OUTPATIENT)
Dept: INTERNAL MEDICINE CLINIC | Facility: CLINIC | Age: 30
End: 2020-04-20

## 2020-09-01 LAB
EXTERNAL HIV CONFIRMATION: NORMAL
EXTERNAL HIV SCREEN: NORMAL
HCV AB SER-ACNC: NEGATIVE

## 2023-01-19 ENCOUNTER — OFFICE VISIT (OUTPATIENT)
Dept: FAMILY MEDICINE CLINIC | Facility: CLINIC | Age: 33
End: 2023-01-19

## 2023-01-19 VITALS
OXYGEN SATURATION: 99 % | TEMPERATURE: 97.4 F | SYSTOLIC BLOOD PRESSURE: 100 MMHG | HEART RATE: 98 BPM | HEIGHT: 62 IN | WEIGHT: 118.6 LBS | DIASTOLIC BLOOD PRESSURE: 74 MMHG | RESPIRATION RATE: 18 BRPM | BODY MASS INDEX: 21.83 KG/M2

## 2023-01-19 DIAGNOSIS — F41.9 ANXIETY: ICD-10-CM

## 2023-01-19 DIAGNOSIS — M25.50 ARTHRALGIA, UNSPECIFIED JOINT: ICD-10-CM

## 2023-01-19 DIAGNOSIS — Z12.4 CERVICAL CANCER SCREENING: Primary | ICD-10-CM

## 2023-01-19 DIAGNOSIS — R53.83 OTHER FATIGUE: ICD-10-CM

## 2023-01-19 DIAGNOSIS — Z76.89 ENCOUNTER TO ESTABLISH CARE: ICD-10-CM

## 2023-01-19 DIAGNOSIS — F41.8 DEPRESSION WITH ANXIETY: ICD-10-CM

## 2023-01-19 RX ORDER — LEVOTHYROXINE SODIUM 0.07 MG/1
TABLET ORAL
COMMUNITY
End: 2023-01-26 | Stop reason: SDUPTHER

## 2023-01-19 NOTE — PROGRESS NOTES
Assessment/Plan:     Encounter to establish care  Patient is here to establish care  Needs referral to GYN for cervical cancer screening, and to discuss birth control options  Patient was diagnosed with hypothyroidism  Needs to get her levels checked  Also has increased anxiety, depression, decreased concentration  Blood work ordered  Discussed coping mechanisms  Discussed self-care  Discussed buspirone and SSRI with patient  Patient tried Zoloft in the past did not feel like it helped  Hypothyroidism   will check thyroid levels  Depression with anxiety   currently not taking any medication  Has increased anxiety and depression  Denies being suicidal   Referral made to psychiatry  Discussed self-care  Discussed medications, will follow-up in 2 weeks         Problem List Items Addressed This Visit        Other    Depression with anxiety      currently not taking any medication  Has increased anxiety and depression  Denies being suicidal   Referral made to psychiatry  Discussed self-care  Discussed medications, will follow-up in 2 weeks         Fatigue    Relevant Orders    TSH, 3rd generation with Free T4 reflex    CBC and differential    Comprehensive metabolic panel    Encounter to establish care     Patient is here to establish care  Needs referral to GYN for cervical cancer screening, and to discuss birth control options  Patient was diagnosed with hypothyroidism  Needs to get her levels checked  Also has increased anxiety, depression, decreased concentration  Blood work ordered  Discussed coping mechanisms  Discussed self-care  Discussed buspirone and SSRI with patient  Patient tried Zoloft in the past did not feel like it helped          Other Visit Diagnoses     Cervical cancer screening    -  Primary    Relevant Orders    Ambulatory Referral to Gynecology    Anxiety        Relevant Orders    Vitamin D 25 hydroxy    TSH, 3rd generation with Free T4 reflex    Iron Panel (Includes Ferritin, Iron Sat%, Iron, and TIBC)    Comprehensive metabolic panel    Ambulatory Referral to Psychiatry    Arthralgia, unspecified joint        Relevant Orders    Lyme Antibody Profile with reflex to WB            Subjective:      Patient ID: Holden Cisneros is a 29 y o  female  Patient is here to establish care  Last primary care provider-  Past medical history-  Past surgical history-  Social history-  - boyfriend x 3 years  Kids- none  Employment- Pharmacy tech, [employer]  Smoker- none  Alcohol- rarely  Other drugs- none  Last diagnostic labs screening- due  Dental exam- 2 yrs  Eyes- 2 yrs  Cervical cancer screening- needs referral  Pap  Current concerns-Tsh levels,           The following portions of the patient's history were reviewed and updated as appropriate: allergies, current medications, past family history, past medical history, past social history, past surgical history and problem list     Review of Systems   Cardiovascular: Positive for palpitations  Gastrointestinal: Negative for anal bleeding and constipation  Neurological: Positive for headaches  Psychiatric/Behavioral: Positive for decreased concentration, sleep disturbance and suicidal ideas  The patient is nervous/anxious  Objective:      /74   Pulse 98   Temp (!) 97 4 °F (36 3 °C) (Temporal)   Resp 18   Ht 5' 2" (1 575 m)   Wt 53 8 kg (118 lb 9 6 oz)   LMP 12/24/2022   SpO2 99%   BMI 21 69 kg/m²          Physical Exam  Vitals and nursing note reviewed  Constitutional:       Appearance: Normal appearance  She is well-developed  HENT:      Head: Normocephalic and atraumatic  Eyes:      Pupils: Pupils are equal, round, and reactive to light  Cardiovascular:      Rate and Rhythm: Normal rate and regular rhythm  Pulses: Normal pulses  Heart sounds: Normal heart sounds  Pulmonary:      Effort: Pulmonary effort is normal       Breath sounds: Normal breath sounds     Abdominal:      General: Bowel sounds are normal       Palpations: Abdomen is soft  Musculoskeletal:         General: Normal range of motion  Cervical back: Normal range of motion  Skin:     General: Skin is warm and dry  Neurological:      General: No focal deficit present  Mental Status: She is alert and oriented to person, place, and time  Psychiatric:         Attention and Perception: Attention and perception normal          Mood and Affect: Mood normal  Affect is flat  Speech: Speech normal          Behavior: Behavior normal          Thought Content:  Thought content normal          Cognition and Memory: Cognition and memory normal          Judgment: Judgment normal

## 2023-01-19 NOTE — PATIENT INSTRUCTIONS
Obtain fasting labs, nothing to eat after midnight, may drink water  Follow up with gynecology  Follow up with psychiatry  Maintain nutrition   Increase fluid hydration  Anxiety   WHAT YOU NEED TO KNOW:   Anxiety is a condition that causes you to feel extremely worried or nervous  The feelings are so strong that they can cause problems with your daily activities or sleep  Anxiety may be triggered by something you fear, or it may happen without a cause  Family or work stress, smoking, caffeine, and alcohol can increase your risk for anxiety  Certain medicines or health conditions can also increase your risk  Anxiety can become a long-term condition if it is not managed or treated  DISCHARGE INSTRUCTIONS:   Call your local emergency number (911 in the [address] [facility]) if:   You have chest pain, tightness, or heaviness that may spread to your shoulders, arms, jaw, neck, or back  You feel like hurting yourself or someone else  Call your doctor if:   Your symptoms get worse or do not get better with treatment  Your anxiety keeps you from doing your regular daily activities  You have new symptoms since your last visit  You have questions or concerns about your condition or care  Medicines:   Medicines  may be given to help you feel more calm and relaxed, and decrease your symptoms  Take your medicine as directed  Contact your healthcare provider if you think your medicine is not helping or if you have side effects  Tell him of her if you are allergic to any medicine  Keep a list of the medicines, vitamins, and herbs you take  Include the amounts, and when and why you take them  Bring the list or the pill bottles to follow-up visits  Carry your medicine list with you in case of an emergency  Manage anxiety:   Talk to someone about your anxiety  Your healthcare provider may suggest counseling  Cognitive behavioral therapy can help you understand and change how you react to events that trigger your symptoms  You might feel more comfortable talking with a friend or family member about your anxiety  Choose someone you know will be supportive and encouraging  Find ways to relax  Activities such as exercise, meditation, or listening to music can help you relax  Spend time with friends, or do things you enjoy  Practice deep breathing  Deep breathing can help you relax when you feel anxious  Focus on taking slow, deep breaths several times a day, or during an anxiety attack  Breathe in through your nose and out through your mouth  Create a regular sleep routine  Regular sleep can help you feel calmer during the day  Go to sleep and wake up at the same times every day  Do not watch television or use the computer right before bed  Your room should be comfortable, dark, and quiet  Eat a variety of healthy foods  Healthy foods include fruits, vegetables, low-fat dairy products, lean meats, fish, whole-grain breads, and cooked beans  Healthy foods can help you feel less anxious and have more energy  Exercise regularly  Exercise can increase your energy level  Exercise may also lift your mood and help you sleep better  Your healthcare provider can help you create an exercise plan  Do not smoke  Nicotine and other chemicals in cigarettes and cigars can increase anxiety  Ask your healthcare provider for information if you currently smoke and need help to quit  E-cigarettes or smokeless tobacco still contain nicotine  Talk to your healthcare provider before you use these products  Do not have caffeine  Caffeine can make your symptoms worse  Do not have foods or drinks that are meant to increase your energy level  Limit or do not drink alcohol  Ask your healthcare provider if alcohol is safe for you  You may not be able to drink alcohol if you take certain anxiety or depression medicines  Limit alcohol to 1 drink per day if you are a woman  Limit alcohol to 2 drinks per day if you are a man   A drink of alcohol is 12 ounces of beer, 5 ounces of wine, or 1½ ounces of liquor  Do not use drugs  Drugs can make your anxiety worse  It can also make anxiety hard to manage  Talk to your healthcare provider if you use drugs and want help to quit  Follow up with your doctor within 2 weeks or as directed:  Write down your questions so you remember to ask them during your visits  © Copyright [employer] 2022 Information is for End User's use only and may not be sold, redistributed or otherwise used for commercial purposes  All illustrations and images included in CareNotes® are the copyrighted property of A D A M , Inc  or [facility] Rayne Tolentino   The above information is an  only  It is not intended as medical advice for individual conditions or treatments  Talk to your doctor, nurse or pharmacist before following any medical regimen to see if it is safe and effective for you

## 2023-01-20 ENCOUNTER — APPOINTMENT (OUTPATIENT)
Dept: LAB | Facility: CLINIC | Age: 33
End: 2023-01-20

## 2023-01-20 ENCOUNTER — TELEPHONE (OUTPATIENT)
Dept: PSYCHIATRY | Facility: CLINIC | Age: 33
End: 2023-01-20

## 2023-01-20 DIAGNOSIS — M25.50 ARTHRALGIA, UNSPECIFIED JOINT: ICD-10-CM

## 2023-01-20 DIAGNOSIS — F41.9 ANXIETY: ICD-10-CM

## 2023-01-20 DIAGNOSIS — R53.83 OTHER FATIGUE: ICD-10-CM

## 2023-01-20 PROBLEM — Z76.89 ENCOUNTER TO ESTABLISH CARE: Status: ACTIVE | Noted: 2023-01-20

## 2023-01-20 LAB
25(OH)D3 SERPL-MCNC: 11.4 NG/ML (ref 30–100)
ALBUMIN SERPL BCP-MCNC: 4.2 G/DL (ref 3.5–5)
ALP SERPL-CCNC: 80 U/L (ref 46–116)
ALT SERPL W P-5'-P-CCNC: 21 U/L (ref 12–78)
ANION GAP SERPL CALCULATED.3IONS-SCNC: 6 MMOL/L (ref 4–13)
AST SERPL W P-5'-P-CCNC: 19 U/L (ref 5–45)
BASOPHILS # BLD AUTO: 0.08 THOUSANDS/ÂΜL (ref 0–0.1)
BASOPHILS NFR BLD AUTO: 1 % (ref 0–1)
BILIRUB SERPL-MCNC: 0.52 MG/DL (ref 0.2–1)
BUN SERPL-MCNC: 6 MG/DL (ref 5–25)
CALCIUM SERPL-MCNC: 9.1 MG/DL (ref 8.3–10.1)
CHLORIDE SERPL-SCNC: 106 MMOL/L (ref 96–108)
CO2 SERPL-SCNC: 25 MMOL/L (ref 21–32)
CREAT SERPL-MCNC: 0.64 MG/DL (ref 0.6–1.3)
EOSINOPHIL # BLD AUTO: 0.11 THOUSAND/ÂΜL (ref 0–0.61)
EOSINOPHIL NFR BLD AUTO: 2 % (ref 0–6)
ERYTHROCYTE [DISTWIDTH] IN BLOOD BY AUTOMATED COUNT: 12.6 % (ref 11.6–15.1)
FERRITIN SERPL-MCNC: 12 NG/ML (ref 8–388)
GFR SERPL CREATININE-BSD FRML MDRD: 120 ML/MIN/1.73SQ M
GLUCOSE P FAST SERPL-MCNC: 85 MG/DL (ref 65–99)
HCT VFR BLD AUTO: 38.5 % (ref 34.8–46.1)
HGB BLD-MCNC: 12.4 G/DL (ref 11.5–15.4)
IMM GRANULOCYTES # BLD AUTO: 0.01 THOUSAND/UL (ref 0–0.2)
IMM GRANULOCYTES NFR BLD AUTO: 0 % (ref 0–2)
IRON SATN MFR SERPL: 39 % (ref 15–50)
IRON SERPL-MCNC: 150 UG/DL (ref 50–170)
LYMPHOCYTES # BLD AUTO: 1.98 THOUSANDS/ÂΜL (ref 0.6–4.47)
LYMPHOCYTES NFR BLD AUTO: 34 % (ref 14–44)
MCH RBC QN AUTO: 28.3 PG (ref 26.8–34.3)
MCHC RBC AUTO-ENTMCNC: 32.2 G/DL (ref 31.4–37.4)
MCV RBC AUTO: 88 FL (ref 82–98)
MONOCYTES # BLD AUTO: 0.45 THOUSAND/ÂΜL (ref 0.17–1.22)
MONOCYTES NFR BLD AUTO: 8 % (ref 4–12)
NEUTROPHILS # BLD AUTO: 3.14 THOUSANDS/ÂΜL (ref 1.85–7.62)
NEUTS SEG NFR BLD AUTO: 55 % (ref 43–75)
NRBC BLD AUTO-RTO: 0 /100 WBCS
PLATELET # BLD AUTO: 285 THOUSANDS/UL (ref 149–390)
PMV BLD AUTO: 10.5 FL (ref 8.9–12.7)
POTASSIUM SERPL-SCNC: 3.8 MMOL/L (ref 3.5–5.3)
PROT SERPL-MCNC: 7.8 G/DL (ref 6.4–8.4)
RBC # BLD AUTO: 4.38 MILLION/UL (ref 3.81–5.12)
SODIUM SERPL-SCNC: 137 MMOL/L (ref 135–147)
TIBC SERPL-MCNC: 382 UG/DL (ref 250–450)
TSH SERPL DL<=0.05 MIU/L-ACNC: 2.27 UIU/ML (ref 0.45–4.5)
WBC # BLD AUTO: 5.77 THOUSAND/UL (ref 4.31–10.16)

## 2023-01-20 NOTE — TELEPHONE ENCOUNTER
Pt was calling in regards to a vm she received  Writer informed her that a referral was received  And explained to her that there are no openings available at this moment   She refused to be place on wait list

## 2023-01-20 NOTE — ASSESSMENT & PLAN NOTE
currently not taking any medication  Has increased anxiety and depression  Denies being suicidal   Referral made to psychiatry  Discussed self-care    Discussed medications, will follow-up in 2 weeks

## 2023-01-20 NOTE — ASSESSMENT & PLAN NOTE
Patient is here to establish care  Needs referral to GYN for cervical cancer screening, and to discuss birth control options  Patient was diagnosed with hypothyroidism  Needs to get her levels checked  Also has increased anxiety, depression, decreased concentration  Blood work ordered  Discussed coping mechanisms  Discussed self-care  Discussed buspirone and SSRI with patient  Patient tried Zoloft in the past did not feel like it helped

## 2023-01-21 LAB — B BURGDOR IGG+IGM SER-ACNC: <0.2 AI

## 2023-01-26 ENCOUNTER — OFFICE VISIT (OUTPATIENT)
Dept: FAMILY MEDICINE CLINIC | Facility: CLINIC | Age: 33
End: 2023-01-26

## 2023-01-26 VITALS
RESPIRATION RATE: 18 BRPM | WEIGHT: 116 LBS | OXYGEN SATURATION: 100 % | DIASTOLIC BLOOD PRESSURE: 64 MMHG | TEMPERATURE: 98 F | HEART RATE: 71 BPM | HEIGHT: 62 IN | BODY MASS INDEX: 21.35 KG/M2 | SYSTOLIC BLOOD PRESSURE: 88 MMHG

## 2023-01-26 DIAGNOSIS — F32.A ANXIETY AND DEPRESSION: ICD-10-CM

## 2023-01-26 DIAGNOSIS — E03.9 ACQUIRED HYPOTHYROIDISM: ICD-10-CM

## 2023-01-26 DIAGNOSIS — E55.9 HYPOVITAMINOSIS D: ICD-10-CM

## 2023-01-26 DIAGNOSIS — Z00.01 ENCOUNTER FOR WELL ADULT EXAM WITH ABNORMAL FINDINGS: Primary | ICD-10-CM

## 2023-01-26 DIAGNOSIS — H93.13 TINNITUS OF BOTH EARS: ICD-10-CM

## 2023-01-26 DIAGNOSIS — F41.9 ANXIETY AND DEPRESSION: ICD-10-CM

## 2023-01-26 RX ORDER — LEVOTHYROXINE SODIUM 0.07 MG/1
75 TABLET ORAL
Qty: 90 TABLET | Refills: 3 | Status: SHIPPED | OUTPATIENT
Start: 2023-01-26

## 2023-01-26 RX ORDER — BUSPIRONE HYDROCHLORIDE 5 MG/1
5 TABLET ORAL 2 TIMES DAILY PRN
Qty: 180 TABLET | Refills: 3 | Status: SHIPPED | OUTPATIENT
Start: 2023-01-26

## 2023-01-26 RX ORDER — ERGOCALCIFEROL 1.25 MG/1
50000 CAPSULE ORAL 2 TIMES WEEKLY
Qty: 16 CAPSULE | Refills: 0 | Status: SHIPPED | OUTPATIENT
Start: 2023-01-26 | End: 2023-03-21

## 2023-01-26 RX ORDER — ESCITALOPRAM OXALATE 10 MG/1
10 TABLET ORAL DAILY
Qty: 30 TABLET | Refills: 0 | Status: SHIPPED | OUTPATIENT
Start: 2023-01-26

## 2023-01-26 NOTE — PATIENT INSTRUCTIONS
Take vit d twice a week for 8 weeks only  !/2 lexapro for 1 week then increase 1 tab  Buspar as needed for anxiety   Set alarm to eat every 4 hrs  Increase fluid hydration  Wellness Visit for Adults   AMBULATORY CARE:   A wellness visit  is when you see your healthcare provider to get screened for health problems  Your healthcare provider will also give you advice on how to stay healthy  Write down your questions so you remember to ask them  Ask your healthcare provider how often you should have a wellness visit  What happens at a wellness visit:  Your healthcare provider will ask about your health, and your family history of health problems  This includes high blood pressure, heart disease, and cancer  He or she will ask if you have symptoms that concern you, if you smoke, and about your mood  You may also be asked about your intake of medicines, supplements, food, and alcohol  Any of the following may be done: Your weight  will be checked  Your height may also be checked so your body mass index (BMI) can be calculated  Your BMI shows if you are at a healthy weight  Your blood pressure  and heart rate will be checked  Your temperature may also be checked  Blood and urine tests  may be done  Blood tests may be done to check your cholesterol levels  Abnormal cholesterol levels increase your risk for heart disease and stroke  You may also need a blood or urine test to check for diabetes if you are at increased risk  Urine tests may be done to look for signs of an infection or kidney disease  A physical exam  includes checking your heartbeat and lungs with a stethoscope  Your healthcare provider may also check your skin to look for sun damage  Screening tests  may be recommended  A screening test is done to check for diseases that may not cause symptoms  The screening tests you may need depend on your age, gender, family history, and lifestyle habits   For example, colorectal screening may be recommended if you are 48years old or older  Screening tests you need if you are a woman:   A Pap smear  is used to screen for cervical cancer  Pap smears are usually done every 3 to 5 years depending on your age  You may need them more often if you have had abnormal Pap smear test results in the past  Ask your healthcare provider how often you should have a Pap smear  A mammogram  is an x-ray of your breasts to screen for breast cancer  Experts recommend mammograms every 2 years starting at age 48 years  You may need a mammogram at age 52 years or younger if you have an increased risk for breast cancer  Talk to your healthcare provider about when you should start having mammograms and how often you need them  Vaccines you may need:   Get an influenza vaccine  every year  The influenza vaccine protects you from the flu  Several types of viruses cause the flu  The viruses change over time, so new vaccines are made each year  Get a tetanus-diphtheria (Td) booster vaccine  every 10 years  This vaccine protects you against tetanus and diphtheria  Tetanus is a severe infection that may cause painful muscle spasms and lockjaw  Diphtheria is a severe bacterial infection that causes a thick covering in the back of your mouth and throat  Get a human papillomavirus (HPV) vaccine  if you are female and aged 23 to 32 or male 23 to 24 and never received it  This vaccine protects you from HPV infection  HPV is the most common infection spread by sexual contact  HPV may also cause vaginal, penile, and anal cancers  Get a pneumococcal vaccine  if you are aged 72 years or older  The pneumococcal vaccine is an injection given to protect you from pneumococcal disease  Pneumococcal disease is an infection caused by pneumococcal bacteria  The infection may cause pneumonia, meningitis, or an ear infection  Get a shingles vaccine  if you are 60 or older, even if you have had shingles before   The shingles vaccine is an injection to protect you from the varicella-zoster virus  This is the same virus that causes chickenpox  Shingles is a painful rash that develops in people who had chickenpox or have been exposed to the virus  How to eat healthy:  My Plate is a model for planning healthy meals  It shows the types and amounts of foods that should go on your plate  Fruits and vegetables make up about half of your plate, and grains and protein make up the other half  A serving of dairy is included on the side of your plate  The amount of calories and serving sizes you need depends on your age, gender, weight, and height  Examples of healthy foods are listed below:  Eat a variety of vegetables  such as dark green, red, and orange vegetables  You can also include canned vegetables low in sodium (salt) and frozen vegetables without added butter or sauces  Eat a variety of fresh fruits , canned fruit in 100% juice, frozen fruit, and dried fruit  Include whole grains  At least half of the grains you eat should be whole grains  Examples include whole-wheat bread, wheat pasta, brown rice, and whole-grain cereals such as oatmeal     Eat a variety of protein foods such as seafood (fish and shellfish), lean meat, and poultry without skin (turkey and chicken)  Examples of lean meats include pork leg, shoulder, or tenderloin, and beef round, sirloin, tenderloin, and extra lean ground beef  Other protein foods include eggs and egg substitutes, beans, peas, soy products, nuts, and seeds  Choose low-fat dairy products such as skim or 1% milk or low-fat yogurt, cheese, and cottage cheese  Limit unhealthy fats  such as butter, hard margarine, and shortening  Exercise:  Exercise at least 30 minutes per day on most days of the week  Some examples of exercise include walking, biking, dancing, and swimming  You can also fit in more physical activity by taking the stairs instead of the elevator or parking farther away from stores   Include muscle strengthening activities 2 days each week  Regular exercise provides many health benefits  It helps you manage your weight, and decreases your risk for type 2 diabetes, heart disease, stroke, and high blood pressure  Exercise can also help improve your mood  Ask your healthcare provider about the best exercise plan for you  General health and safety guidelines:   Do not smoke  Nicotine and other chemicals in cigarettes and cigars can cause lung damage  Ask your healthcare provider for information if you currently smoke and need help to quit  E-cigarettes or smokeless tobacco still contain nicotine  Talk to your healthcare provider before you use these products  Limit alcohol  A drink of alcohol is 12 ounces of beer, 5 ounces of wine, or 1½ ounces of liquor  Lose weight, if needed  Being overweight increases your risk of certain health conditions  These include heart disease, high blood pressure, type 2 diabetes, and certain types of cancer  Protect your skin  Do not sunbathe or use tanning beds  Use sunscreen with a SPF 15 or higher  Apply sunscreen at least 15 minutes before you go outside  Reapply sunscreen every 2 hours  Wear protective clothing, hats, and sunglasses when you are outside  Drive safely  Always wear your seatbelt  Make sure everyone in your car wears a seatbelt  A seatbelt can save your life if you are in an accident  Do not use your cell phone when you are driving  This could distract you and cause an accident  Pull over if you need to make a call or send a text message  Practice safe sex  Use latex condoms if are sexually active and have more than one partner  Your healthcare provider may recommend screening tests for sexually transmitted infections (STIs)  Wear helmets, lifejackets, and protective gear  Always wear a helmet when you ride a bike or motorcycle, go skiing, or play sports that could cause a head injury  Wear protective equipment when you play sports  Wear a lifejacket when you are on a boat or doing water sports  © Copyright [address] 2022 Information is for End User's use only and may not be sold, redistributed or otherwise used for commercial purposes  All illustrations and images included in CareNotes® are the copyrighted property of A D A M , Inc  or Ayush Tolentino   The above information is an  only  It is not intended as medical advice for individual conditions or treatments  Talk to your doctor, nurse or pharmacist before following any medical regimen to see if it is safe and effective for you

## 2023-01-26 NOTE — ASSESSMENT & PLAN NOTE
Physical completed  Reviewed blood work  Discussed management of hypovitaminosis  We will start Lexapro for anxiety and depression  Also will start BuSpar as needed    Referral made for comprehensive discussed maintaining good nutrition hydration and rest

## 2023-01-26 NOTE — PROGRESS NOTES
ADULT ANNUAL [facility] PRIMARY CARE    NAME: Lorna Esquivel  AGE: 29 y o  SEX: female  : 1990     DATE: 2023     Assessment and Plan:     Problem List Items Addressed This Visit        Endocrine    Hypothyroidism    Relevant Medications    levothyroxine 75 mcg tablet       Other    Encounter for well adult exam with abnormal findings - Primary     Physical completed  Reviewed blood work  Discussed management of hypovitaminosis  We will start Lexapro for anxiety and depression  Also will start BuSpar as needed  Referral made for comprehensive discussed maintaining good nutrition hydration and rest        Other Visit Diagnoses     Hypovitaminosis D        Relevant Medications    ergocalciferol (VITAMIN D2) 50,000 units    Anxiety and depression        Relevant Medications    escitalopram (Lexapro) 10 mg tablet    busPIRone (BUSPAR) 5 mg tablet    Tinnitus of both ears        Relevant Orders    Audiogram screen            Immunizations and preventive care screenings were discussed with patient today  Appropriate education was printed on patient's after visit summary  Counseling:  Alcohol/drug use: discussed moderation in alcohol intake, the recommendations for healthy alcohol use, and avoidance of illicit drug use  Dental Health: discussed importance of regular tooth brushing, flossing, and dental visits  Injury prevention: discussed safety/seat belts, safety helmets, smoke detectors, carbon dioxide detectors, and smoking near bedding or upholstery  Sexual health: discussed sexually transmitted diseases, partner selection, use of condoms, avoidance of unintended pregnancy, and contraceptive alternatives  Exercise: the importance of regular exercise/physical activity was discussed  Recommend exercise 3-5 times per week for at least 30 minutes  Return in about 4 weeks (around 2023)       Chief Complaint:     Chief Complaint   Patient presents with   • Follow-up   • Physical Exam   • Dizziness      History of Present Illness:     Adult Annual Physical   Patient here for a comprehensive physical exam  The patient reports problems - anxiety, tinnitus, dizzy, nutrition, decreased concentration  Diet and Physical Activity  Diet/Nutrition: poor diet  Exercise: no formal exercise  Depression Screening  PHQ-2/9 Depression Screening         General Health  Sleep: sleeps poorly  Hearing: normal - bilateral and decreased - bilateral   Vision: no vision problems and most recent eye exam <1 year ago  Dental: regular dental visits  /GYN Health  Last menstrual period:  1 mth  Contraceptive method: barrier  History of STDs?: no      Review of Systems:     Review of Systems   Constitutional: Negative  HENT: Positive for ear pain  Eyes: Negative  Respiratory: Negative  Cardiovascular: Negative  Gastrointestinal: Negative  Endocrine: Negative  Genitourinary: Negative  Musculoskeletal: Positive for arthralgias  Allergic/Immunologic: Negative  Neurological: Negative  Psychiatric/Behavioral: Positive for decreased concentration and sleep disturbance  The patient is nervous/anxious  Past Medical History:     Past Medical History:   Diagnosis Date   • Anxiety     Last Asessed: 12 Sep 2014   • Depression     Last Assesd: 12 Sep 2014   • Hypothyroidism    • PTSD (post-traumatic stress disorder)       Past Surgical History:     History reviewed  No pertinent surgical history  Social History:     Social History     Socioeconomic History   • Marital status: Single     Spouse name: None   • Number of children: None   • Years of education: None   • Highest education level: None   Occupational History   • None   Tobacco Use   • Smoking status: Never   • Smokeless tobacco: Never   Substance and Sexual Activity   • Alcohol use:  Yes     Alcohol/week: 1 0 standard drink     Types: 1 Shots of liquor per week     Comment: socially   • Drug use: No   • Sexual activity: None   Other Topics Concern   • None   Social History Narrative   • None     Social Determinants of Health     Financial Resource Strain: Not on file   Food Insecurity: Not on file   Transportation Needs: Not on file   Physical Activity: Not on file   Stress: Not on file   Social Connections: Not on file   Intimate Partner Violence: Not on file   Housing Stability: Not on file      Family History:     Family History   Problem Relation Age of Onset   • ADD / ADHD Father    • Asthma Father    • Other Father         dyslexia   • ADD / ADHD Other    • Asthma Other    • Other Other         dyslexia   • Diabetes Other    • Hypertension Other       Current Medications:     Current Outpatient Medications   Medication Sig Dispense Refill   • busPIRone (BUSPAR) 5 mg tablet Take 1 tablet (5 mg total) by mouth 2 (two) times a day as needed (anxiety) 180 tablet 3   • ergocalciferol (VITAMIN D2) 50,000 units Take 1 capsule (50,000 Units total) by mouth 2 (two) times a week for 16 doses 16 capsule 0   • escitalopram (Lexapro) 10 mg tablet Take 1 tablet (10 mg total) by mouth daily 30 tablet 0   • levothyroxine 75 mcg tablet Take 1 tablet (75 mcg total) by mouth daily in the early morning 90 tablet 3     No current facility-administered medications for this visit  Allergies:      Allergies   Allergen Reactions   • Other    • Penicillins Hives   • Latex Rash     Other reaction(s): rash  More of an irritation   • Nickel Rash      Physical Exam:     BP (!) 88/64   Pulse 71   Temp 98 °F (36 7 °C) (Temporal)   Resp 18   Ht 5' 2" (1 575 m)   Wt 52 6 kg (116 lb)   SpO2 100%   BMI 21 22 kg/m²     Physical Exam     Grace Tia, [address] [facility]  PRIMARY CARE

## 2023-02-18 DIAGNOSIS — E03.9 ACQUIRED HYPOTHYROIDISM: ICD-10-CM

## 2023-02-18 DIAGNOSIS — F41.9 ANXIETY AND DEPRESSION: ICD-10-CM

## 2023-02-18 DIAGNOSIS — F32.A ANXIETY AND DEPRESSION: ICD-10-CM

## 2023-02-24 RX ORDER — LEVOTHYROXINE SODIUM 0.07 MG/1
75 TABLET ORAL
Qty: 90 TABLET | Refills: 4 | Status: SHIPPED | OUTPATIENT
Start: 2023-02-24

## 2023-02-24 RX ORDER — ESCITALOPRAM OXALATE 10 MG/1
TABLET ORAL
Qty: 90 TABLET | Refills: 1 | Status: SHIPPED | OUTPATIENT
Start: 2023-02-24

## 2023-02-27 ENCOUNTER — OFFICE VISIT (OUTPATIENT)
Dept: FAMILY MEDICINE CLINIC | Facility: CLINIC | Age: 33
End: 2023-02-27

## 2023-02-27 VITALS
WEIGHT: 113.4 LBS | HEIGHT: 62 IN | RESPIRATION RATE: 18 BRPM | HEART RATE: 101 BPM | SYSTOLIC BLOOD PRESSURE: 98 MMHG | OXYGEN SATURATION: 100 % | BODY MASS INDEX: 20.87 KG/M2 | TEMPERATURE: 97 F | DIASTOLIC BLOOD PRESSURE: 68 MMHG

## 2023-02-27 DIAGNOSIS — F41.8 DEPRESSION WITH ANXIETY: ICD-10-CM

## 2023-02-27 DIAGNOSIS — F90.1 ATTENTION DEFICIT HYPERACTIVITY DISORDER (ADHD), PREDOMINANTLY HYPERACTIVE TYPE: Primary | ICD-10-CM

## 2023-02-27 RX ORDER — DEXTROAMPHETAMINE SACCHARATE, AMPHETAMINE ASPARTATE, DEXTROAMPHETAMINE SULFATE AND AMPHETAMINE SULFATE 2.5; 2.5; 2.5; 2.5 MG/1; MG/1; MG/1; MG/1
10 TABLET ORAL
Qty: 30 TABLET | Refills: 0 | Status: SHIPPED | OUTPATIENT
Start: 2023-02-27

## 2023-02-27 NOTE — PROGRESS NOTES
Name: Mary Lou Olivas      : 1990      MRN: [id number]  Encounter Provider: Estella Severin, CRNP  Encounter Date: 2023   Encounter department: [place] [address]  Attention deficit hyperactivity disorder (ADHD), predominantly hyperactive type  Assessment & Plan:  Continues to have decreased concentration, hyperactivity, losing train of thought  Reports Lexapro has been helping with some of anxiety  Patient feels like the hyperactivity is making her less productive at work, is also starting school and is concerned that she might have problems focusing  Reviewed reporting, meets the criteria for diagnosis of ADHD  discussed side effects of stimulant and antidepressant  We will start low-dose of Adderall, will follow up to monitor effectiveness  Discussed nonpharmacological intervention    Orders:  -     amphetamine-dextroamphetamine (ADDERALL, 10MG,) 10 mg tablet; Take 1 tablet (10 mg total) by mouth 2 (two) times a day Max Daily Amount: 20 mg    2  Depression with anxiety  Assessment & Plan:  Reports Lexapro has been helping her  We will continue at the same dosage  Discussed self-care  May take BuSpar for acute episodes of anxiety  Subjective      And is here for follow-up on anxiety, attention deficit  That the buspirone has been helping her with the anxiety continues to have palpitation  Continues to have decreased concentration, hyperactivity  Review of Systems   Psychiatric/Behavioral: Positive for decreased concentration  The patient is nervous/anxious and is hyperactive          Current Outpatient Medications on File Prior to Visit   Medication Sig   • busPIRone (BUSPAR) 5 mg tablet Take 1 tablet (5 mg total) by mouth 2 (two) times a day as needed (anxiety)   • ergocalciferol (VITAMIN D2) 50,000 units Take 1 capsule (50,000 Units total) by mouth 2 (two) times a week for 16 doses   • escitalopram (LEXAPRO) 10 mg tablet TAKE 1 TABLET BY MOUTH EVERY DAY   • levothyroxine 75 mcg tablet TAKE 1 TABLET (75 MCG TOTAL) BY MOUTH DAILY IN THE EARLY MORNING       Objective     BP 98/68   Pulse 101   Temp (!) 97 °F (36 1 °C) (Temporal)   Resp 18   Ht 5' 2" (1 575 m)   Wt 51 4 kg (113 lb 6 4 oz)   SpO2 100%   BMI 20 74 kg/m²     Physical Exam  Vitals and nursing note reviewed  Constitutional:       Appearance: Normal appearance  She is well-developed  HENT:      Head: Normocephalic and atraumatic  Eyes:      Pupils: Pupils are equal, round, and reactive to light  Cardiovascular:      Rate and Rhythm: Regular rhythm  Tachycardia present  Pulses: Normal pulses  Heart sounds: Normal heart sounds  Pulmonary:      Effort: Pulmonary effort is normal       Breath sounds: Normal breath sounds  Musculoskeletal:         General: Normal range of motion  Cervical back: Normal range of motion  Skin:     General: Skin is warm and dry  Neurological:      General: No focal deficit present  Mental Status: She is alert and oriented to person, place, and time  Psychiatric:         Mood and Affect: Mood normal          Behavior: Behavior normal          Thought Content:  Thought content normal          Judgment: Judgment normal        TRUE Young

## 2023-02-27 NOTE — PATIENT INSTRUCTIONS
Vitamin d3 2000 IU daily  Continue buspar  Continue lexapro  Anxiety   WHAT YOU NEED TO KNOW:   Anxiety is a condition that causes you to feel extremely worried or nervous  The feelings are so strong that they can cause problems with your daily activities or sleep  Anxiety may be triggered by something you fear, or it may happen without a cause  Family or work stress, smoking, caffeine, and alcohol can increase your risk for anxiety  Certain medicines or health conditions can also increase your risk  Anxiety can become a long-term condition if it is not managed or treated  DISCHARGE INSTRUCTIONS:   Call your local emergency number (911 in the [address]) if:   You have chest pain, tightness, or heaviness that may spread to your shoulders, arms, jaw, neck, or back  You feel like hurting yourself or someone else  Call your doctor if:   Your symptoms get worse or do not get better with treatment  Your anxiety keeps you from doing your regular daily activities  You have new symptoms since your last visit  You have questions or concerns about your condition or care  Medicines:   Medicines  may be given to help you feel more calm and relaxed, and decrease your symptoms  Take your medicine as directed  Contact your healthcare provider if you think your medicine is not helping or if you have side effects  Tell your provider if you are allergic to any medicine  Keep a list of the medicines, vitamins, and herbs you take  Include the amounts, and when and why you take them  Bring the list or the pill bottles to follow-up visits  Carry your medicine list with you in case of an emergency  Manage anxiety:   Talk to someone about your anxiety  Your healthcare provider may suggest counseling  Cognitive behavioral therapy can help you understand and change how you react to events that trigger your symptoms  You might feel more comfortable talking with a friend or family member about your anxiety   Choose someone you know will be supportive and encouraging  Find ways to relax  Activities such as exercise, meditation, or listening to music can help you relax  Spend time with friends, or do things you enjoy  Practice deep breathing  Deep breathing can help you relax when you feel anxious  Focus on taking slow, deep breaths several times a day, or during an anxiety attack  Breathe in through your nose and out through your mouth  Create a regular sleep routine  Regular sleep can help you feel calmer during the day  Go to sleep and wake up at the same times every day  Do not watch television or use the computer right before bed  Your room should be comfortable, dark, and quiet  Eat a variety of healthy foods  Healthy foods include fruits, vegetables, low-fat dairy products, lean meats, fish, whole-grain breads, and cooked beans  Healthy foods can help you feel less anxious and have more energy  Exercise regularly  Exercise can increase your energy level  Exercise may also lift your mood and help you sleep better  Your healthcare provider can help you create an exercise plan  Do not smoke  Nicotine and other chemicals in cigarettes and cigars can increase anxiety  Ask your healthcare provider for information if you currently smoke and need help to quit  E-cigarettes or smokeless tobacco still contain nicotine  Talk to your healthcare provider before you use these products  Do not have caffeine  Caffeine can make your symptoms worse  Do not have foods or drinks that are meant to increase your energy level  Limit or do not drink alcohol  Ask your healthcare provider if alcohol is safe for you  You may not be able to drink alcohol if you take certain anxiety or depression medicines  Limit alcohol to 1 drink per day if you are a woman  Limit alcohol to 2 drinks per day if you are a man  A drink of alcohol is 12 ounces of beer, 5 ounces of wine, or 1½ ounces of liquor  Do not use drugs  Drugs can make your anxiety worse  It can also make anxiety hard to manage  Talk to your healthcare provider if you use drugs and want help to quit  Follow up with your doctor within 2 weeks or as directed:  Write down your questions so you remember to ask them during your visits  © Copyright Hema Linder 2022 Information is for End User's use only and may not be sold, redistributed or otherwise used for commercial purposes  The above information is an  only  It is not intended as medical advice for individual conditions or treatments  Talk to your doctor, nurse or pharmacist before following any medical regimen to see if it is safe and effective for you

## 2023-02-27 NOTE — ASSESSMENT & PLAN NOTE
Continues to have decreased concentration, hyperactivity, losing train of thought  Reports Lexapro has been helping with some of anxiety  Patient feels like the hyperactivity is making her less productive at work, is also starting school and is concerned that she might have problems focusing  Reviewed reporting, meets the criteria for diagnosis of ADHD  discussed side effects of stimulant and antidepressant  We will start low-dose of Adderall, will follow up to monitor effectiveness    Discussed nonpharmacological intervention

## 2023-02-27 NOTE — ASSESSMENT & PLAN NOTE
Reports Lexapro has been helping her  We will continue at the same dosage  Discussed self-care  May take BuSpar for acute episodes of anxiety

## 2023-03-21 DIAGNOSIS — E03.9 ACQUIRED HYPOTHYROIDISM: ICD-10-CM

## 2023-03-21 RX ORDER — LEVOTHYROXINE SODIUM 0.07 MG/1
75 TABLET ORAL
Qty: 90 TABLET | Refills: 5 | Status: SHIPPED | OUTPATIENT
Start: 2023-03-21

## 2023-03-28 DIAGNOSIS — F90.1 ATTENTION DEFICIT HYPERACTIVITY DISORDER (ADHD), PREDOMINANTLY HYPERACTIVE TYPE: ICD-10-CM

## 2023-03-30 RX ORDER — DEXTROAMPHETAMINE SACCHARATE, AMPHETAMINE ASPARTATE, DEXTROAMPHETAMINE SULFATE AND AMPHETAMINE SULFATE 2.5; 2.5; 2.5; 2.5 MG/1; MG/1; MG/1; MG/1
10 TABLET ORAL
Qty: 30 TABLET | Refills: 0 | Status: SHIPPED | OUTPATIENT
Start: 2023-03-30 | End: 2023-03-31 | Stop reason: SDUPTHER

## 2023-03-31 ENCOUNTER — OFFICE VISIT (OUTPATIENT)
Dept: FAMILY MEDICINE CLINIC | Facility: CLINIC | Age: 33
End: 2023-03-31

## 2023-03-31 VITALS
BODY MASS INDEX: 20.24 KG/M2 | HEART RATE: 73 BPM | TEMPERATURE: 97.9 F | WEIGHT: 110 LBS | RESPIRATION RATE: 16 BRPM | DIASTOLIC BLOOD PRESSURE: 62 MMHG | OXYGEN SATURATION: 99 % | HEIGHT: 62 IN | SYSTOLIC BLOOD PRESSURE: 88 MMHG

## 2023-03-31 DIAGNOSIS — F90.1 ATTENTION DEFICIT HYPERACTIVITY DISORDER (ADHD), PREDOMINANTLY HYPERACTIVE TYPE: ICD-10-CM

## 2023-03-31 RX ORDER — DEXTROAMPHETAMINE SACCHARATE, AMPHETAMINE ASPARTATE, DEXTROAMPHETAMINE SULFATE AND AMPHETAMINE SULFATE 2.5; 2.5; 2.5; 2.5 MG/1; MG/1; MG/1; MG/1
10 TABLET ORAL
Qty: 30 TABLET | Refills: 0 | Status: SHIPPED | OUTPATIENT
Start: 2023-03-31

## 2023-03-31 NOTE — PROGRESS NOTES
Name: Fallon Polk      : 1990      MRN: [id number]  Encounter Provider: TRUE Montes De Oca  Encounter Date: 3/31/2023   Encounter department: [place] [address]  Attention deficit hyperactivity disorder (ADHD), predominantly hyperactive type  Assessment & Plan:  He has been taking 10 mg twice a day of Adderall with good results  Discussed weight loss  Discussed maintaining good nutrition  Patient is also hypotensive  States that she does not drink enough fluids  Advised patient if weight continues to decrease will need to stop Adderall  Encouraged to meal prep, increase water hydration  Orders:  -     amphetamine-dextroamphetamine (ADDERALL, 10MG,) 10 mg tablet; Take 1 tablet (10 mg total) by mouth 2 (two) times a day Max Daily Amount: 20 mg           Subjective      Patient is here to follow-up on management of ADHD  Has been taking 10 mg of Adderall twice a day and reports it is helping her with focus  Patient currently working and is also going to beauty school  Has 2 more months before she graduates  Has lost some weight  Reports that she does not have time to eat now that her appetite is poor      Review of Systems    Current Outpatient Medications on File Prior to Visit   Medication Sig   • busPIRone (BUSPAR) 5 mg tablet Take 1 tablet (5 mg total) by mouth 2 (two) times a day as needed (anxiety)   • escitalopram (LEXAPRO) 10 mg tablet TAKE 1 TABLET BY MOUTH EVERY DAY   • levothyroxine 75 mcg tablet TAKE 1 TABLET (75 MCG TOTAL) BY MOUTH DAILY IN THE EARLY MORNING   • silver sulfadiazine (SILVADENE,SSD) 1 % cream Use as directed   • [DISCONTINUED] amphetamine-dextroamphetamine (ADDERALL, 10MG,) 10 mg tablet Take 1 tablet (10 mg total) by mouth 2 (two) times a day Max Daily Amount: 20 mg   • ergocalciferol (VITAMIN D2) 50,000 units Take 1 capsule (50,000 Units total) by mouth 2 (two) times a week for 16 doses       Objective     BP (!) 88/62 " Pulse 73   Temp 97 9 °F (36 6 °C) (Temporal)   Resp 16   Ht 5' 2\" (1 575 m)   Wt 49 9 kg (110 lb)   LMP 03/31/2023 (Exact Date)   SpO2 99%   BMI 20 12 kg/m²     Physical Exam  Vitals and nursing note reviewed  Constitutional:       Appearance: Normal appearance  She is well-developed  HENT:      Head: Normocephalic and atraumatic  Cardiovascular:      Rate and Rhythm: Normal rate and regular rhythm  Pulses: Normal pulses  Heart sounds: Normal heart sounds  Pulmonary:      Effort: Pulmonary effort is normal       Breath sounds: Normal breath sounds  Abdominal:      General: Bowel sounds are normal       Palpations: Abdomen is soft  Musculoskeletal:         General: Normal range of motion  Cervical back: Normal range of motion  Skin:     General: Skin is warm and dry  Neurological:      Mental Status: She is alert and oriented to person, place, and time  Psychiatric:         Mood and Affect: Mood normal          Behavior: Behavior normal          Thought Content:  Thought content normal          Judgment: Judgment normal        TRUE Coley  "

## 2023-03-31 NOTE — ASSESSMENT & PLAN NOTE
He has been taking 10 mg twice a day of Adderall with good results  Discussed weight loss  Discussed maintaining good nutrition  Patient is also hypotensive  States that she does not drink enough fluids  Advised patient if weight continues to decrease will need to stop Adderall  Encouraged to meal prep, increase water hydration

## 2023-04-28 ENCOUNTER — OFFICE VISIT (OUTPATIENT)
Dept: FAMILY MEDICINE CLINIC | Facility: CLINIC | Age: 33
End: 2023-04-28

## 2023-04-28 VITALS
SYSTOLIC BLOOD PRESSURE: 90 MMHG | WEIGHT: 110.2 LBS | OXYGEN SATURATION: 99 % | DIASTOLIC BLOOD PRESSURE: 62 MMHG | HEIGHT: 62 IN | BODY MASS INDEX: 20.28 KG/M2 | RESPIRATION RATE: 14 BRPM | TEMPERATURE: 97.3 F | HEART RATE: 66 BPM

## 2023-04-28 DIAGNOSIS — K29.00 ACUTE SUPERFICIAL GASTRITIS WITHOUT HEMORRHAGE: Primary | ICD-10-CM

## 2023-04-28 DIAGNOSIS — F90.1 ATTENTION DEFICIT HYPERACTIVITY DISORDER (ADHD), PREDOMINANTLY HYPERACTIVE TYPE: ICD-10-CM

## 2023-04-28 RX ORDER — PANTOPRAZOLE SODIUM 20 MG/1
20 TABLET, DELAYED RELEASE ORAL
Qty: 30 TABLET | Refills: 5 | Status: SHIPPED | OUTPATIENT
Start: 2023-04-28 | End: 2023-10-25

## 2023-04-28 RX ORDER — SIMETHICONE 180 MG
180 CAPSULE ORAL EVERY 8 HOURS PRN
Qty: 30 CAPSULE | Refills: 0 | Status: SHIPPED | OUTPATIENT
Start: 2023-04-28

## 2023-04-28 RX ORDER — DEXTROAMPHETAMINE SACCHARATE, AMPHETAMINE ASPARTATE, DEXTROAMPHETAMINE SULFATE AND AMPHETAMINE SULFATE 2.5; 2.5; 2.5; 2.5 MG/1; MG/1; MG/1; MG/1
10 TABLET ORAL
Qty: 30 TABLET | Refills: 0 | Status: SHIPPED | OUTPATIENT
Start: 2023-04-28

## 2023-04-28 NOTE — ASSESSMENT & PLAN NOTE
Reports acute abdominal pain that started last night  Ate rice and beans  Discussed management with the colon  May take Protonix  Discussed increasing fluids  Has been negative for appendicitis, cholecystitis    Advised to call if no improvement

## 2023-04-28 NOTE — PATIENT INSTRUCTIONS
Take simethicone every 8 hours as needed for abdominal pain  Take Protonix daily  Monitor for diarrhea or fever  Gastritis   WHAT YOU NEED TO KNOW:   Gastritis is inflammation or irritation of the lining of your stomach  DISCHARGE INSTRUCTIONS:   Call 911 for any of the following: You develop chest pain or shortness of breath  Return to the emergency department if:   You vomit blood  You have black or bloody bowel movements  You have severe stomach or back pain  Contact your healthcare provider if:   You have a fever  You have new or worsening symptoms, even after treatment  You have questions or concerns about your condition or care  Medicines:   Medicines  may be given to help treat a bacterial infection or decrease stomach acid  Take your medicine as directed  Contact your healthcare provider if you think your medicine is not helping or if you have side effects  Tell your provider if you are allergic to any medicine  Keep a list of the medicines, vitamins, and herbs you take  Include the amounts, and when and why you take them  Bring the list or the pill bottles to follow-up visits  Carry your medicine list with you in case of an emergency  Manage or prevent gastritis:   Do not smoke  Nicotine and other chemicals in cigarettes and cigars can make your symptoms worse and cause lung damage  Ask your healthcare provider for information if you currently smoke and need help to quit  E-cigarettes or smokeless tobacco still contain nicotine  Talk to your healthcare provider before you use these products  Do not drink alcohol  Alcohol can prevent healing and make your gastritis worse  Talk to your healthcare provider if you need help to stop drinking  Do not take NSAIDs or aspirin unless directed  These and similar medicines can cause irritation  If your healthcare provider says it is okay to take NSAIDs, take them with food  Do not eat foods that cause irritation  Foods such as oranges and salsa can cause burning or pain  Eat a variety of healthy foods  Examples include fruits (not citrus), vegetables, low-fat dairy products, beans, whole-grain breads, and lean meats and fish  Try to eat small meals, and drink water with your meals  Do not eat for at least 3 hours before you go to bed  Find ways to relax and decrease stress  Stress can increase stomach acid and make gastritis worse  Activities such as yoga, meditation, or listening to music can help you relax  Spend time with friends, or do things you enjoy  Follow up with your healthcare provider as directed: You may need ongoing tests or treatment, or referral to a gastroenterologist  Write down your questions so you remember to ask them during your visits  © Copyright [employer]ane 2022 Information is for End User's use only and may not be sold, redistributed or otherwise used for commercial purposes  The above information is an  only  It is not intended as medical advice for individual conditions or treatments  Talk to your doctor, nurse or pharmacist before following any medical regimen to see if it is safe and effective for you

## 2023-04-28 NOTE — ASSESSMENT & PLAN NOTE
Has been taking Adderall  Reports improvement with concentration  Has not lost any weight since the last   Continue medication  Continue nonpharmacological intervention to manage ADHD

## 2023-04-28 NOTE — PROGRESS NOTES
Name: Samantha Jin      : 1990      MRN: [id number]  Encounter Provider: TRUE Fan  Encounter Date: 2023   Encounter department: [place] [address]  Acute superficial gastritis without hemorrhage  Assessment & Plan:  Reports acute abdominal pain that started last night  Ate rice and beans  Discussed management with the colon  May take Protonix  Discussed increasing fluids  Has been negative for appendicitis, cholecystitis  Advised to call if no improvement    Orders:  -     simethicone (MYLICON,GAS-X) 430 MG capsule; Take 1 capsule (180 mg total) by mouth every 8 (eight) hours as needed for flatulence  -     pantoprazole (PROTONIX) 20 mg tablet; Take 1 tablet (20 mg total) by mouth daily before breakfast    2  Attention deficit hyperactivity disorder (ADHD), predominantly hyperactive type  Assessment & Plan:  Has been taking Adderall  Reports improvement with concentration  Has not lost any weight since the last   Continue medication  Continue nonpharmacological intervention to manage ADHD  Orders:  -     amphetamine-dextroamphetamine (ADDERALL, 10MG,) 10 mg tablet; Take 1 tablet (10 mg total) by mouth 2 (two) times a day Max Daily Amount: 20 mg           Subjective      Patient is being seen with complaints of having abdominal pain started last night  Denies any diarrhea or constipation  Does have some nausea  Has been doing well with Adderall  Is almost finished with school  Has not lost any weight while on this medication    Review of Systems   Constitutional: Negative for fatigue and fever  Gastrointestinal: Positive for abdominal distention, abdominal pain and nausea  Negative for constipation and diarrhea  Psychiatric/Behavioral: Positive for decreased concentration         Current Outpatient Medications on File Prior to Visit   Medication Sig   • busPIRone (BUSPAR) 5 mg tablet Take 1 tablet (5 mg total) by mouth 2 "(two) times a day as needed (anxiety)   • escitalopram (LEXAPRO) 10 mg tablet TAKE 1 TABLET BY MOUTH EVERY DAY   • levothyroxine 75 mcg tablet TAKE 1 TABLET (75 MCG TOTAL) BY MOUTH DAILY IN THE EARLY MORNING   • silver sulfadiazine (SILVADENE,SSD) 1 % cream Use as directed   • [DISCONTINUED] amphetamine-dextroamphetamine (ADDERALL, 10MG,) 10 mg tablet Take 1 tablet (10 mg total) by mouth 2 (two) times a day Max Daily Amount: 20 mg   • ergocalciferol (VITAMIN D2) 50,000 units Take 1 capsule (50,000 Units total) by mouth 2 (two) times a week for 16 doses       Objective     BP 90/62   Pulse 66   Temp (!) 97 3 °F (36 3 °C) (Temporal)   Resp 14   Ht 5' 2\" (1 575 m)   Wt 50 kg (110 lb 3 2 oz)   LMP 03/31/2023 (Exact Date)   SpO2 99%   BMI 20 16 kg/m²     Physical Exam  Vitals and nursing note reviewed  Constitutional:       Appearance: She is well-developed  HENT:      Head: Normocephalic and atraumatic  Right Ear: External ear normal       Left Ear: External ear normal    Eyes:      Pupils: Pupils are equal, round, and reactive to light  Cardiovascular:      Rate and Rhythm: Normal rate and regular rhythm  Pulmonary:      Effort: Pulmonary effort is normal    Abdominal:      General: Abdomen is flat  Bowel sounds are normal       Palpations: Abdomen is soft  There is no shifting dullness, fluid wave or mass  Tenderness: There is abdominal tenderness in the epigastric area  There is guarding  There is no right CVA tenderness, left CVA tenderness or rebound  Negative signs include Montemayor's sign, Rovsing's sign, McBurney's sign and psoas sign  Musculoskeletal:         General: Normal range of motion  Cervical back: Normal range of motion  Skin:     General: Skin is warm and dry  Neurological:      Mental Status: She is alert and oriented to person, place, and time         TRUE Hartman  "

## 2023-04-29 DIAGNOSIS — E03.9 ACQUIRED HYPOTHYROIDISM: ICD-10-CM

## 2023-04-29 RX ORDER — LEVOTHYROXINE SODIUM 0.07 MG/1
75 TABLET ORAL
Qty: 90 TABLET | Refills: 6 | Status: SHIPPED | OUTPATIENT
Start: 2023-04-29

## 2023-05-20 DIAGNOSIS — K29.00 ACUTE SUPERFICIAL GASTRITIS WITHOUT HEMORRHAGE: ICD-10-CM

## 2023-05-21 RX ORDER — PANTOPRAZOLE SODIUM 20 MG/1
TABLET, DELAYED RELEASE ORAL
Qty: 90 TABLET | Refills: 2 | Status: SHIPPED | OUTPATIENT
Start: 2023-05-21

## 2023-06-07 DIAGNOSIS — F90.1 ATTENTION DEFICIT HYPERACTIVITY DISORDER (ADHD), PREDOMINANTLY HYPERACTIVE TYPE: ICD-10-CM

## 2023-06-08 RX ORDER — DEXTROAMPHETAMINE SACCHARATE, AMPHETAMINE ASPARTATE, DEXTROAMPHETAMINE SULFATE AND AMPHETAMINE SULFATE 2.5; 2.5; 2.5; 2.5 MG/1; MG/1; MG/1; MG/1
10 TABLET ORAL
Qty: 30 TABLET | Refills: 0 | Status: SHIPPED | OUTPATIENT
Start: 2023-06-08

## 2023-08-12 DIAGNOSIS — F90.1 ATTENTION DEFICIT HYPERACTIVITY DISORDER (ADHD), PREDOMINANTLY HYPERACTIVE TYPE: ICD-10-CM

## 2023-08-17 RX ORDER — DEXTROAMPHETAMINE SACCHARATE, AMPHETAMINE ASPARTATE, DEXTROAMPHETAMINE SULFATE AND AMPHETAMINE SULFATE 2.5; 2.5; 2.5; 2.5 MG/1; MG/1; MG/1; MG/1
10 TABLET ORAL
Qty: 30 TABLET | Refills: 0 | Status: SHIPPED | OUTPATIENT
Start: 2023-08-17

## 2023-10-18 DIAGNOSIS — F90.1 ATTENTION DEFICIT HYPERACTIVITY DISORDER (ADHD), PREDOMINANTLY HYPERACTIVE TYPE: Primary | ICD-10-CM

## 2023-10-24 DIAGNOSIS — F90.1 ATTENTION DEFICIT HYPERACTIVITY DISORDER (ADHD), PREDOMINANTLY HYPERACTIVE TYPE: Primary | ICD-10-CM

## 2023-10-24 RX ORDER — ATOMOXETINE 40 MG/1
40 CAPSULE ORAL 2 TIMES DAILY
Qty: 60 CAPSULE | Refills: 0 | Status: SHIPPED | OUTPATIENT
Start: 2023-10-24 | End: 2023-11-23

## 2024-02-28 ENCOUNTER — TELEPHONE (OUTPATIENT)
Dept: ADMINISTRATIVE | Facility: OTHER | Age: 34
End: 2024-02-28

## 2024-02-29 NOTE — TELEPHONE ENCOUNTER
----- Message from Patrica Payton MA sent at 2/28/2024  7:30 AM EST -----  Regarding: Hep C, HIV, Pap  02/28/24 7:30 AM    Hello, our patient attached above has had Hepatitis C and HIV completed/performed. Please assist in updating the patient chart by pulling the Care Everywhere (CE) document. The date of service is 9/1/2020.     Thank you,  Patrica Payton  [facility]      02/28/24 7:31 AM    Hello, our patient Tenisha Valdes has had Pap Smear (HPV) aka Cervical Cancer Screening completed/performed. Please assist in updating the patient chart by pulling the Care Everywhere (CE) document. The date of service is 2/1/2024.     Thank you,  Patrica Payton MA  [facility]           
----- Message from Patrica Payton MA sent at 2/28/2024  7:30 AM EST -----  Regarding: Hep C, HIV, Pap  02/28/24 7:30 AM    Hello, our patient attached above has had Hepatitis C and HIV completed/performed. Please assist in updating the patient chart by pulling the Care Everywhere (CE) document. The date of service is 9/1/2020.     Thank you,  Patrica Payton  [facility]      02/28/24 7:31 AM    Hello, our patient Tenisha Valdes has had Pap Smear (HPV) aka Cervical Cancer Screening completed/performed. Please assist in updating the patient chart by pulling the Care Everywhere (CE) document. The date of service is 2/1/2024.     Thank you,  Patrica Payton MA  [facility]           
Upon review of the In Basket request we were able to locate, review, and update the patient chart as requested for Hepatitis C  and HIV.    Any additional questions or concerns should be emailed to the Practice Liaisons via the appropriate education email address, please do not reply via In Basket.    Thank you  Sydney Patton MA           
Fall [facility]

## 2024-03-15 DIAGNOSIS — F32.A ANXIETY AND DEPRESSION: ICD-10-CM

## 2024-03-15 DIAGNOSIS — E03.9 ACQUIRED HYPOTHYROIDISM: ICD-10-CM

## 2024-03-15 DIAGNOSIS — F41.9 ANXIETY AND DEPRESSION: ICD-10-CM

## 2024-03-15 RX ORDER — ESCITALOPRAM OXALATE 10 MG/1
10 TABLET ORAL DAILY
Qty: 90 TABLET | Refills: 1 | Status: SHIPPED | OUTPATIENT
Start: 2024-03-15

## 2024-03-15 RX ORDER — LEVOTHYROXINE SODIUM 0.07 MG/1
75 TABLET ORAL
Qty: 90 TABLET | Refills: 6 | Status: SHIPPED | OUTPATIENT
Start: 2024-03-15